# Patient Record
Sex: MALE | Race: WHITE | NOT HISPANIC OR LATINO | Employment: FULL TIME | URBAN - METROPOLITAN AREA
[De-identification: names, ages, dates, MRNs, and addresses within clinical notes are randomized per-mention and may not be internally consistent; named-entity substitution may affect disease eponyms.]

---

## 2021-04-08 ENCOUNTER — OFFICE VISIT (OUTPATIENT)
Dept: URGENT CARE | Facility: CLINIC | Age: 46
End: 2021-04-08
Payer: COMMERCIAL

## 2021-04-08 VITALS
HEIGHT: 72 IN | TEMPERATURE: 98 F | RESPIRATION RATE: 16 BRPM | OXYGEN SATURATION: 99 % | BODY MASS INDEX: 31.69 KG/M2 | HEART RATE: 74 BPM | DIASTOLIC BLOOD PRESSURE: 100 MMHG | WEIGHT: 234 LBS | SYSTOLIC BLOOD PRESSURE: 152 MMHG

## 2021-04-08 DIAGNOSIS — H60.391 OTHER INFECTIVE OTITIS EXTERNA OF RIGHT EAR, UNSPECIFIED CHRONICITY: ICD-10-CM

## 2021-04-08 DIAGNOSIS — H61.21 HEARING LOSS DUE TO CERUMEN IMPACTION, RIGHT: Primary | ICD-10-CM

## 2021-04-08 DIAGNOSIS — R03.0 ELEVATED BLOOD PRESSURE READING: ICD-10-CM

## 2021-04-08 PROCEDURE — 69209 REMOVE IMPACTED EAR WAX UNI: CPT | Performed by: PHYSICIAN ASSISTANT

## 2021-04-08 PROCEDURE — 99203 OFFICE O/P NEW LOW 30 MIN: CPT | Performed by: PHYSICIAN ASSISTANT

## 2021-04-08 RX ORDER — OFLOXACIN 3 MG/ML
10 SOLUTION AURICULAR (OTIC) DAILY
Qty: 5 ML | Refills: 0 | Status: SHIPPED | OUTPATIENT
Start: 2021-04-08 | End: 2021-04-15

## 2021-04-08 NOTE — PATIENT INSTRUCTIONS
Cerumen Impaction   WHAT YOU NEED TO KNOW:   Cerumen impaction is the blockage of the outer ear canal by tightly packed cerumen (earwax)  It is generally treated with procedures such as flushing or suctioning the ear canal or the use of instruments to remove the impaction  DISCHARGE INSTRUCTIONS:   Medicines:  · Ear drops: These are used to soften the wax in your ear  Wax softening ear drops may be bought without a prescription  Ask your healthcare provider how often you should use this medicine  Read the instructions carefully before you use the ear drops  Do the following when you put in ear drops:     ? Warm the drops by holding the bottle in your hands for a few minutes  Cold ear drops may make you dizzy  ? Lie down with the affected ear toward the ceiling  You may also stand with your head tilted to one side  ? Pull your ear lobe up and back, and place the correct number of drops into the ear  ? Keep your ear facing up for 5 to 10 minutes so the drops coat the outer ear canal      ? Gently clean the outer part of the ear with a cotton swab  Do not  place the cotton swab or anything inside your ear canal  This increases the risk of damaging your eardrum  · Take your medicine as directed  Contact your healthcare provider if you think your medicine is not helping or if you have side effects  Tell him of her if you are allergic to any medicine  Keep a list of the medicines, vitamins, and herbs you take  Include the amounts, and when and why you take them  Bring the list or the pill bottles to follow-up visits  Carry your medicine list with you in case of an emergency  Follow up with your healthcare provider as directed:  Write down your questions so you remember to ask them during your visits  Contact your healthcare provider if:   · You have a fever  · You have trouble hearing or ringing in your ear  · You have questions about your condition or care      Return to the emergency department if:   · You feel dizzy  · You have discharge or blood coming out of your ear  · Your ear pain does not go away or gets worse  © Copyright [employer] 2018 Information is for End User's use only and may not be sold, redistributed or otherwise used for commercial purposes  All illustrations and images included in CareNotes® are the copyrighted property of [employer]  or [facility] [employer]   The above information is an  only  It is not intended as medical advice for individual conditions or treatments  Talk to your doctor, nurse or pharmacist before following any medical regimen to see if it is safe and effective for you

## 2021-04-08 NOTE — PROGRESS NOTES
[facility]        NAME: Sylvester Asencio is a 48 y o  male  : 1975    MRN: [id number]  DATE: 2021  TIME: 5:54 PM    Assessment and Plan   Hearing loss due to cerumen impaction, right [H61 21]  1  Hearing loss due to cerumen impaction, right  Ear cerumen removal   2  Other infective otitis externa of right ear, unspecified chronicity  ofloxacin (FLOXIN) 0 3 % otic solution   3  Elevated blood pressure reading       Of note, elevated BP during triage  States his PCP and ciropractor have noted elevated BP w/o official dx of HTN  Asymptomatic  Advised f/u with PCP for possible HTN meds  Patient Instructions   Medications as prescribed  Do not use Q-tips  Follow up with PCP in 3-5 days  Proceed to  ER if symptoms worsen  Chief Complaint     Chief Complaint   Patient presents with    blocked right ear     was cleaning right ear with a q-tip last night and since that time cannot hear out of ear         History of Present Illness       Patient is a 48year old male with no sig PMHX presents to the office c/o blocked hearing in right ear since last night  States he was cleaning his ear with a q-tip and has not been able to hear since  No pain  Tried OTC ear drops w/o relief  Review of Systems   Review of Systems   HENT: Positive for hearing loss  Negative for ear pain            Current Medications       Current Outpatient Medications:     ofloxacin (FLOXIN) 0 3 % otic solution, Administer 10 drops to the right ear daily for 7 days, Disp: 5 mL, Rfl: 0    Current Allergies     Allergies as of 2021    (No Known Allergies)            The following portions of the patient's history were reviewed and updated as appropriate: allergies, current medications, past family history, past medical history, past social history, past surgical history and problem list      Past Medical History:   Diagnosis Date    Known health problems: none        Past Surgical History:   Procedure Laterality Date    NO PAST SURGERIES         Family History   Problem Relation Age of Onset    Arthritis Mother     Cancer Father          Medications have been verified  Objective   /100   Pulse 74   Temp 98 °F (36 7 °C)   Resp 16   Ht 6' (1 829 m)   Wt 106 kg (234 lb)   SpO2 99%   BMI 31 74 kg/m²   No LMP for male patient  Physical Exam     Physical Exam  Vitals signs and nursing note reviewed  Constitutional:       Appearance: Normal appearance  He is well-developed  HENT:      Head: Normocephalic and atraumatic  Right Ear: Ear canal and external ear normal  Swelling (mild extenral canal) present  No drainage or tenderness  A middle ear effusion (serous nonpurulent) is present  There is impacted cerumen  Tympanic membrane is not perforated  Left Ear: Tympanic membrane, ear canal and external ear normal       Nose: Nose normal       Mouth/Throat:      Pharynx: Uvula midline  Eyes:      General: Lids are normal       Conjunctiva/sclera: Conjunctivae normal       Pupils: Pupils are equal, round, and reactive to light  Neck:      Musculoskeletal: Neck supple  Cardiovascular:      Rate and Rhythm: Normal rate and regular rhythm  Heart sounds: Normal heart sounds  No murmur  No friction rub  No gallop  Pulmonary:      Effort: Pulmonary effort is normal       Breath sounds: Normal breath sounds  No stridor  No wheezing or rales  Musculoskeletal: Normal range of motion  Skin:     General: Skin is warm and dry  Capillary Refill: Capillary refill takes less than 2 seconds  Neurological:      Mental Status: He is alert  Ear cerumen removal    Date/Time: 4/8/2021 5:38 PM  Performed by: Chika Wellington PA-C  Authorized by: Chika Wellington PA-C   Universal Protocol:  Consent: Verbal consent obtained    Risks and benefits: risks, benefits and alternatives were discussed  Consent given by: patient and parent  Time out: Immediately prior to procedure a "time out" was called to verify the correct patient, procedure, equipment, support staff and site/side marked as required  Timeout called at: 4/8/2021 4:45 PM   Patient understanding: patient states understanding of the procedure being performed  Required items: required blood products, implants, devices, and special equipment available  Patient identity confirmed: verbally with patient      Patient location:  Bedside  Procedure details:     Location:  R ear    Procedure type: irrigation only      Approach:  External  Post-procedure details:     Complication:  None    Hearing quality:  Improved    Patient tolerance of procedure:   Tolerated well, no immediate complications

## 2021-08-21 ENCOUNTER — APPOINTMENT (EMERGENCY)
Dept: CT IMAGING | Facility: HOSPITAL | Age: 46
End: 2021-08-21
Payer: COMMERCIAL

## 2021-08-21 ENCOUNTER — APPOINTMENT (EMERGENCY)
Dept: RADIOLOGY | Facility: HOSPITAL | Age: 46
End: 2021-08-21
Payer: COMMERCIAL

## 2021-08-21 ENCOUNTER — APPOINTMENT (EMERGENCY)
Dept: MRI IMAGING | Facility: HOSPITAL | Age: 46
End: 2021-08-21
Payer: COMMERCIAL

## 2021-08-21 ENCOUNTER — HOSPITAL ENCOUNTER (EMERGENCY)
Facility: HOSPITAL | Age: 46
Discharge: HOME | End: 2021-08-21
Attending: EMERGENCY MEDICINE
Payer: COMMERCIAL

## 2021-08-21 VITALS
TEMPERATURE: 97.8 F | OXYGEN SATURATION: 97 % | RESPIRATION RATE: 18 BRPM | SYSTOLIC BLOOD PRESSURE: 144 MMHG | WEIGHT: 236.11 LBS | BODY MASS INDEX: 32.02 KG/M2 | DIASTOLIC BLOOD PRESSURE: 99 MMHG | HEART RATE: 93 BPM

## 2021-08-21 DIAGNOSIS — R20.0 NUMBNESS: ICD-10-CM

## 2021-08-21 DIAGNOSIS — I63.9 STROKE (CEREBRUM) (HCC): Primary | ICD-10-CM

## 2021-08-21 DIAGNOSIS — I10 ESSENTIAL HYPERTENSION: ICD-10-CM

## 2021-08-21 DIAGNOSIS — M54.9 BACK PAIN: ICD-10-CM

## 2021-08-21 LAB
ANION GAP SERPL CALCULATED.3IONS-SCNC: 7 MMOL/L (ref 4–13)
APTT PPP: 26 SECONDS (ref 23–37)
BILIRUB UR QL STRIP: NEGATIVE
BUN SERPL-MCNC: 17 MG/DL (ref 5–25)
CALCIUM SERPL-MCNC: 9.2 MG/DL (ref 8.3–10.1)
CHLORIDE SERPL-SCNC: 102 MMOL/L (ref 100–108)
CLARITY UR: CLEAR
CO2 SERPL-SCNC: 31 MMOL/L (ref 21–32)
COLOR UR: YELLOW
CREAT SERPL-MCNC: 1.06 MG/DL (ref 0.6–1.3)
ERYTHROCYTE [DISTWIDTH] IN BLOOD BY AUTOMATED COUNT: 12.5 % (ref 11.6–15.1)
GFR SERPL CREATININE-BSD FRML MDRD: 83 ML/MIN/1.73SQ M
GLUCOSE SERPL-MCNC: 108 MG/DL (ref 65–140)
GLUCOSE SERPL-MCNC: 111 MG/DL (ref 65–140)
GLUCOSE UR STRIP-MCNC: NEGATIVE MG/DL
HCT VFR BLD AUTO: 47.6 % (ref 36.5–49.3)
HGB BLD-MCNC: 16.3 G/DL (ref 12–17)
HGB UR QL STRIP.AUTO: NEGATIVE
INR PPP: 0.97 (ref 0.84–1.19)
KETONES UR STRIP-MCNC: NEGATIVE MG/DL
LEUKOCYTE ESTERASE UR QL STRIP: NEGATIVE
MCH RBC QN AUTO: 31.5 PG (ref 26.8–34.3)
MCHC RBC AUTO-ENTMCNC: 34.2 G/DL (ref 31.4–37.4)
MCV RBC AUTO: 92 FL (ref 82–98)
NITRITE UR QL STRIP: NEGATIVE
PH UR STRIP.AUTO: 7 [PH]
PLATELET # BLD AUTO: 256 THOUSANDS/UL (ref 149–390)
PMV BLD AUTO: 10.3 FL (ref 8.9–12.7)
POTASSIUM SERPL-SCNC: 4.2 MMOL/L (ref 3.5–5.3)
PROT UR STRIP-MCNC: NEGATIVE MG/DL
PROTHROMBIN TIME: 12.7 SECONDS (ref 11.6–14.5)
RBC # BLD AUTO: 5.18 MILLION/UL (ref 3.88–5.62)
SARS-COV-2 RNA RESP QL NAA+PROBE: NEGATIVE
SODIUM SERPL-SCNC: 140 MMOL/L (ref 136–145)
SP GR UR STRIP.AUTO: 1.01 (ref 1–1.03)
TROPONIN I SERPL-MCNC: <0.02 NG/ML
UROBILINOGEN UR QL STRIP.AUTO: 0.2 E.U./DL
WBC # BLD AUTO: 6.43 THOUSAND/UL (ref 4.31–10.16)

## 2021-08-21 PROCEDURE — 84484 ASSAY OF TROPONIN QUANT: CPT | Performed by: EMERGENCY MEDICINE

## 2021-08-21 PROCEDURE — U0005 INFEC AGEN DETEC AMPLI PROBE: HCPCS | Performed by: EMERGENCY MEDICINE

## 2021-08-21 PROCEDURE — 80048 BASIC METABOLIC PNL TOTAL CA: CPT | Performed by: EMERGENCY MEDICINE

## 2021-08-21 PROCEDURE — 82948 REAGENT STRIP/BLOOD GLUCOSE: CPT

## 2021-08-21 PROCEDURE — 93005 ELECTROCARDIOGRAM TRACING: CPT

## 2021-08-21 PROCEDURE — 70551 MRI BRAIN STEM W/O DYE: CPT

## 2021-08-21 PROCEDURE — 71045 X-RAY EXAM CHEST 1 VIEW: CPT

## 2021-08-21 PROCEDURE — 99285 EMERGENCY DEPT VISIT HI MDM: CPT | Performed by: EMERGENCY MEDICINE

## 2021-08-21 PROCEDURE — 99284 EMERGENCY DEPT VISIT MOD MDM: CPT

## 2021-08-21 PROCEDURE — G1004 CDSM NDSC: HCPCS

## 2021-08-21 PROCEDURE — 36415 COLL VENOUS BLD VENIPUNCTURE: CPT | Performed by: EMERGENCY MEDICINE

## 2021-08-21 PROCEDURE — U0003 INFECTIOUS AGENT DETECTION BY NUCLEIC ACID (DNA OR RNA); SEVERE ACUTE RESPIRATORY SYNDROME CORONAVIRUS 2 (SARS-COV-2) (CORONAVIRUS DISEASE [COVID-19]), AMPLIFIED PROBE TECHNIQUE, MAKING USE OF HIGH THROUGHPUT TECHNOLOGIES AS DESCRIBED BY CMS-2020-01-R: HCPCS | Performed by: EMERGENCY MEDICINE

## 2021-08-21 PROCEDURE — 85027 COMPLETE CBC AUTOMATED: CPT | Performed by: EMERGENCY MEDICINE

## 2021-08-21 PROCEDURE — 70498 CT ANGIOGRAPHY NECK: CPT

## 2021-08-21 PROCEDURE — 85610 PROTHROMBIN TIME: CPT | Performed by: EMERGENCY MEDICINE

## 2021-08-21 PROCEDURE — 85730 THROMBOPLASTIN TIME PARTIAL: CPT | Performed by: EMERGENCY MEDICINE

## 2021-08-21 PROCEDURE — 70496 CT ANGIOGRAPHY HEAD: CPT

## 2021-08-21 PROCEDURE — 81003 URINALYSIS AUTO W/O SCOPE: CPT | Performed by: EMERGENCY MEDICINE

## 2021-08-21 RX ORDER — NAPROXEN 375 MG/1
375 TABLET ORAL 2 TIMES DAILY WITH MEALS
Qty: 20 TABLET | Refills: 0 | Status: SHIPPED | OUTPATIENT
Start: 2021-08-21

## 2021-08-21 RX ORDER — ASPIRIN 81 MG/1
324 TABLET, CHEWABLE ORAL ONCE
Status: COMPLETED | OUTPATIENT
Start: 2021-08-21 | End: 2021-08-21

## 2021-08-21 RX ADMIN — IOHEXOL 85 ML: 350 INJECTION, SOLUTION INTRAVENOUS at 10:02

## 2021-08-21 RX ADMIN — ASPIRIN 324 MG: 81 TABLET, CHEWABLE ORAL at 11:13

## 2021-08-21 NOTE — ED NOTES
Patient reports symptoms have been on going for about 2 months prior to coming in to the ED today       Anusha Rosado RN  08/21/21 1002

## 2021-08-21 NOTE — Clinical Note
Pamela Andrade was seen and treated in our emergency department on 8/21/2021  Diagnosis:     Taj Sierra  may return to work on return date  He may return on this date: 08/23/2021         If you have any questions or concerns, please don't hesitate to call        Raimundo Garcia MD    ______________________________           _______________          _______________  Hospital Representative                              Date                                Time

## 2021-08-21 NOTE — ED PROVIDER NOTES
History  Chief Complaint   Patient presents with    Back Pain     pt states R sided back pain and increase in urination for the past few weeks  states lower abdominal pain with urination as well  this morning woke up with R side arm and leg numbness  denies numbness in face, equal strength  neuro otherwise intact     47-year-old male with unremarkable past medical history presents with 2-3 weeks of urinary frequency and intermittent right low back and right lower abdominal pain  Patient denies these complaints at this time, however  Patient comes to the emergency department as he presents with right arm and right leg numbness and tingling that started upon awakening this morning at 8:00 a m     Last normal was at 10:00 p m  Last night when patient went to bed  No previous history of transient ischemic attack or stroke  Patient is not on blood thinners  Sisters at bedside states that patient has not had slurred speech or facial droop  Patient has no complaints at this time other than stating his right arm and leg feel numb  On exam, patient has normal sensation  Denies history of neck or muscle spasms, cervical radiculopathy, low back injury, surgery or history of sciatica  No history of nephrolithiasis, abdominal aortic aneurysm, hernia or diverticulitis  Patient denies fever, chills, urinary symptoms, constipation, bloody stool, abdominal pain, chest pain, shortness of breath, nausea, vomiting or diarrhea  Review of medical chart shows no recent hospitalizations        History provided by:  Patient, medical records and relative   used: No    CVA/TIA-like Symptoms  Presenting symptoms: sensory loss    Presenting symptoms: no change in level of consciousness, no headaches, no disturbances in coordination, no language symptoms, no loss of balance, no visual change and no weakness    Date/time of last known well:  8/20/2021 10:00 PM  Onset quality:  Sudden  Duration:  90 minutes  Timing: Constant  Progression:  Unchanged  Similar to previous episodes: no    Associated symptoms: paresthesias    Associated symptoms: no chest pain, no dizziness, no facial pain, no fall, no fever, no bladder incontinence, no nausea, no neck pain, no seizures, no vertigo and no vomiting        None       Past Medical History:   Diagnosis Date    Known health problems: none        Past Surgical History:   Procedure Laterality Date    NO PAST SURGERIES         Family History   Problem Relation Age of Onset    Arthritis Mother     Cancer Father      I have reviewed and agree with the history as documented  E-Cigarette/Vaping    E-Cigarette Use Never User      E-Cigarette/Vaping Substances     Social History     Tobacco Use    Smoking status: Never Smoker    Smokeless tobacco: Never Used   Vaping Use    Vaping Use: Never used   Substance Use Topics    Alcohol use: Yes    Drug use: Never       Review of Systems   Constitutional: Negative  Negative for chills and fever  HENT: Negative  Eyes: Negative  Respiratory: Negative  Negative for cough and shortness of breath  Cardiovascular: Negative  Negative for chest pain  Gastrointestinal: Positive for abdominal pain ( right lower quadrant, resolved)  Negative for blood in stool, constipation, diarrhea, nausea, rectal pain and vomiting  Endocrine: Negative  Genitourinary: Positive for frequency ( resolved)  Negative for bladder incontinence, decreased urine volume, difficulty urinating, discharge, dysuria, flank pain, hematuria, penile pain, penile swelling, scrotal swelling, testicular pain ( resolved) and urgency  Musculoskeletal: Positive for back pain ( resolved)  Negative for arthralgias, gait problem, joint swelling, myalgias, neck pain and neck stiffness  Skin: Negative  Negative for rash  Allergic/Immunologic: Negative  Neurological: Positive for numbness and paresthesias   Negative for dizziness, vertigo, tremors, seizures, syncope, facial asymmetry, speech difficulty, weakness, light-headedness, headaches, disturbances in coordination and loss of balance  Hematological: Negative  Psychiatric/Behavioral: Negative  Negative for hallucinations  Physical Exam  Physical Exam  Vitals and nursing note reviewed  Exam conducted with a chaperone present  Constitutional:       General: He is not in acute distress  Appearance: Normal appearance  He is well-developed  He is obese  He is not ill-appearing, toxic-appearing or diaphoretic  Comments: Well appearing, in no acute distress   HENT:      Head: Normocephalic and atraumatic  Jaw: There is normal jaw occlusion  Right Ear: Hearing, tympanic membrane, ear canal and external ear normal  There is no impacted cerumen  No mastoid tenderness  No hemotympanum  Left Ear: Hearing, tympanic membrane, ear canal and external ear normal  There is no impacted cerumen  No mastoid tenderness  No hemotympanum  Nose: Nose normal       Right Nostril: No epistaxis  Left Nostril: No epistaxis  Right Sinus: No maxillary sinus tenderness or frontal sinus tenderness  Left Sinus: No maxillary sinus tenderness or frontal sinus tenderness  Mouth/Throat:      Lips: Pink  No lesions  Mouth: Mucous membranes are moist  No lacerations or angioedema  Tongue: No lesions  Tongue does not deviate from midline  Palate: No mass and lesions  Pharynx: Oropharynx is clear  Uvula midline  No pharyngeal swelling, oropharyngeal exudate, posterior oropharyngeal erythema or uvula swelling  Tonsils: No tonsillar exudate or tonsillar abscesses  Eyes:      General: Lids are normal  Vision grossly intact  Gaze aligned appropriately  No visual field deficit or scleral icterus  Right eye: No discharge  Left eye: No discharge  Extraocular Movements: Extraocular movements intact  Right eye: No nystagmus  Left eye: No nystagmus  Conjunctiva/sclera: Conjunctivae normal       Right eye: Right conjunctiva is not injected  Left eye: Left conjunctiva is not injected  Pupils: Pupils are equal, round, and reactive to light  Neck:      Thyroid: No thyroid mass or thyromegaly  Trachea: Trachea and phonation normal    Cardiovascular:      Rate and Rhythm: Normal rate and regular rhythm  Pulses: Normal pulses  Radial pulses are 2+ on the right side and 2+ on the left side  Dorsalis pedis pulses are 2+ on the right side and 2+ on the left side  Heart sounds: Normal heart sounds, S1 normal and S2 normal    Pulmonary:      Effort: Pulmonary effort is normal  No tachypnea, accessory muscle usage, respiratory distress or retractions  Breath sounds: Normal breath sounds and air entry  No stridor or decreased air movement  No decreased breath sounds, wheezing, rhonchi or rales  Chest:      Chest wall: No tenderness  Abdominal:      General: Abdomen is flat  Bowel sounds are normal  There is no distension  Palpations: Abdomen is soft  Abdomen is not rigid  There is no mass  Tenderness: There is no abdominal tenderness  There is no right CVA tenderness, left CVA tenderness, guarding or rebound  Negative signs include Farrell's sign and McBurney's sign  Hernia: No hernia is present  Genitourinary:     Penis: Normal        Testes: Normal    Musculoskeletal:         General: No swelling, tenderness, deformity or signs of injury  Normal range of motion  Cervical back: Full passive range of motion without pain, normal range of motion and neck supple  No rigidity  No spinous process tenderness or muscular tenderness  Right lower leg: No edema  Left lower leg: No edema  Lymphadenopathy:      Cervical: No cervical adenopathy  Skin:     General: Skin is warm and dry  Capillary Refill: Capillary refill takes less than 2 seconds        Coloration: Skin is not ashen, cyanotic, jaundiced, mottled, pale or sallow  Findings: No abrasion, abscess, acne, bruising, burn, ecchymosis, erythema, signs of injury, laceration, lesion, petechiae, rash or wound  Rash is not macular or papular  Neurological:      General: No focal deficit present  Mental Status: He is alert and oriented to person, place, and time  Mental status is at baseline  He is not disoriented  GCS: GCS eye subscore is 4  GCS verbal subscore is 5  GCS motor subscore is 6  Cranial Nerves: Cranial nerves are intact  No cranial nerve deficit, dysarthria or facial asymmetry  Sensory: Sensation is intact  No sensory deficit  Motor: Motor function is intact  No weakness, tremor, atrophy, abnormal muscle tone or seizure activity  Coordination: Coordination is intact  Coordination normal  Finger-Nose-Finger Test normal       Gait: Gait is intact  Gait normal       Comments: Patient is AAOx4, GCS 15; speaking clearly and appropriately; motor and sensation intact; visual fields intact; cranial nerves II-XII grossly intact; no facial droop, slurred speech or arm drift  Patient states he has numbness to right arm and leg, however, upon physical exam he has full sensation to bilateral arms and legs   Psychiatric:         Attention and Perception: Attention and perception normal  He is attentive  Mood and Affect: Mood and affect normal          Speech: Speech normal          Behavior: Behavior normal  Behavior is cooperative  Thought Content:  Thought content normal          Cognition and Memory: Cognition and memory normal          Judgment: Judgment normal          Vital Signs  ED Triage Vitals [08/21/21 0925]   Temperature Pulse Respirations Blood Pressure SpO2   97 5 °F (36 4 °C) 74 18 (!) 223/132 100 %      Temp Source Heart Rate Source Patient Position - Orthostatic VS BP Location FiO2 (%)   Temporal Monitor Lying Left arm --      Pain Score       5           Vitals:    08/21/21 1343 08/21/21 1345 08/21/21 1350 08/21/21 1355   BP: 145/100 144/99     Pulse: 81 81 85 93   Patient Position - Orthostatic VS: Sitting            Visual Acuity  Visual Acuity      Most Recent Value   L Pupil Size (mm)  3   R Pupil Size (mm)  3          ED Medications  Medications   iohexol (OMNIPAQUE) 350 MG/ML injection (SINGLE-DOSE) 100 mL (85 mL Intravenous Given 8/21/21 1002)   aspirin chewable tablet 324 mg (324 mg Oral Given 8/21/21 1113)       Diagnostic Studies  Results Reviewed     Procedure Component Value Units Date/Time    Novel Coronavirus (Covid-19),PCR [facility] - 2 hour stat [[id number]]  (Normal) Collected: 08/21/21 0944    Lab Status: Final result Specimen: Nares from Nasopharyngeal Swab Updated: 08/21/21 1041     SARS-CoV-2 Negative    Narrative: The specimen collection materials, transport medium, and/or testing methodology utilized in the production of these test results have been proven to be reliable in a limited validation with an abbreviated program under the Emergency Utilization Authorization provided by the FDA  Testing reported as "Presumptive positive" will be confirmed with secondary testing to ensure result accuracy  Clinical caution and judgement should be used with the interpretation of these results with consideration of the clinical impression and other laboratory testing  Testing reported as "Positive" or "Negative" has been proven to be accurate according to standard laboratory validation requirements  All testing is performed with control materials showing appropriate reactivity at standard intervals        Troponin I [[id number]]  (Normal) Collected: 08/21/21 0944    Lab Status: Final result Specimen: Blood from Arm, Left Updated: 08/21/21 1010     Troponin I <0 02 ng/mL     Basic metabolic panel [[id number]] Collected: 08/21/21 0944    Lab Status: Final result Specimen: Blood from Arm, Left Updated: 08/21/21 1003     Sodium 140 mmol/L      Potassium 4 2 mmol/L      Chloride 102 mmol/L      CO2 31 mmol/L      ANION GAP 7 mmol/L      BUN 17 mg/dL      Creatinine 1 06 mg/dL      Glucose 111 mg/dL      Calcium 9 2 mg/dL      eGFR 83 ml/min/1 73sq m     Narrative:      [place] guidelines for Chronic Kidney Disease (CKD):     Stage 1 with normal or high GFR (GFR > 90 mL/min/1 73 square meters)    Stage 2 Mild CKD (GFR = 60-89 mL/min/1 73 square meters)    Stage 3A Moderate CKD (GFR = 45-59 mL/min/1 73 square meters)    Stage 3B Moderate CKD (GFR = 30-44 mL/min/1 73 square meters)    Stage 4 Severe CKD (GFR = 15-29 mL/min/1 73 square meters)    Stage 5 End Stage CKD (GFR <15 mL/min/1 73 square meters)  Note: GFR calculation is accurate only with a steady state creatinine    Protime-INR [[id number]]  (Normal) Collected: 08/21/21 0944    Lab Status: Final result Specimen: Blood from Arm, Left Updated: 08/21/21 1002     Protime 12 7 seconds      INR 0 97    APTT [[id number]]  (Normal) Collected: 08/21/21 0944    Lab Status: Final result Specimen: Blood from Arm, Left Updated: 08/21/21 1002     PTT 26 seconds     UA w Reflex to Microscopic w Reflex to Culture [[id number]] Collected: 08/21/21 0948    Lab Status: Final result Specimen: Urine, Clean Catch Updated: 08/21/21 0954     Color, UA Yellow     Clarity, UA Clear     Specific Gravity, UA 1 015     pH, UA 7 0     Leukocytes, UA Negative     Nitrite, UA Negative     Protein, UA Negative mg/dl      Glucose, UA Negative mg/dl      Ketones, UA Negative mg/dl      Urobilinogen, UA 0 2 E U /dl      Bilirubin, UA Negative     Blood, UA Negative    CBC and Platelet [[id number]]  (Normal) Collected: 08/21/21 0944    Lab Status: Final result Specimen: Blood from Arm, Left Updated: 08/21/21 0950     WBC 6 43 Thousand/uL      RBC 5 18 Million/uL      Hemoglobin 16 3 g/dL      Hematocrit 47 6 %      MCV 92 fL      MCH 31 5 pg      MCHC 34 2 g/dL      RDW 12 5 %      Platelets 810 Thousands/uL      MPV 10 3 fL     Fingerstick Glucose (POCT) [[id number]]  (Normal) Collected: 08/21/21 0942    Lab Status: Final result Updated: 08/21/21 0944     POC Glucose 108 mg/dl                  MRI brain wo contrast   Final Result by Waylon Ramos DO (08/21 1337)      Normal       Workstation performed: [id number]         X-ray chest 1 view portable   ED Interpretation by Prateek Thao MD (08/21 1038)   Chest x-ray read and interpreted by me  Negative for pneumothorax, mediastinal widening, rib fracture, focal consolidation or pleural effusion  CTA stroke alert (head/neck)   Final Result by Waylon Ramos DO (08/21 1009)      No focal intracranial stenosis or aneurysm  Mild atheromatous plaque along the right internal carotid artery without significant stenosis  Findings were directly discussed with Dr Lizzie Ferrari at 10:03 AM                         Workstation performed: [id number]         CT stroke alert brain   Final Result by Waylon Ramos DO (08/21 1005)      No acute intracranial abnormality  Findings were directly discussed with Dr Lizzie Ferrari at 10:03 AM       Workstation performed: [id number]                    Procedures  ECG 12 Lead Documentation Only    Date/Time: 8/21/2021 9:27 AM  Performed by: Prateek Thao MD  Authorized by: Prateek Thao MD     Indications / Diagnosis:  Stroke  ECG reviewed by me, the ED Provider: yes    Patient location:  ED  Previous ECG:     Comparison to cardiac monitor: Yes    Interpretation:     Interpretation: normal    Rate:     ECG rate:  83bpm    ECG rate assessment: normal    Rhythm:     Rhythm: sinus rhythm    Ectopy:     Ectopy: none    QRS:     QRS axis:  Left  Conduction:     Conduction: normal    ST segments:     ST segments:  Normal  T waves:     T waves: flattening and inverted      Inverted:  III, aVR and V1  Comments:      No STEMI  IL 150ms  QRS 90ms  QT 415ms    Cardiac monitoring ordered  Heart rate and rhythm as above   I have personally read and interpreted the EKG as above  ED Course  ED Course as of Aug 21 1739   Sat Aug 21, 2021   9984 Stroke Alert called      1281 Spoke with Dr Robert Macias, Neurology who states that stroke is unlikely with NIH 0 and no right facial numbness but recommends rule out with CTH/CTA and observation status for MRI brain  If negative, symptoms could be due to hypertensive urgency  NIH 0  Patient has no back or abdominal pain on exam  He states his right arm and leg are numb but has no decreased sensation on clinical examination  80 CTA:IMPRESSION:     No focal intracranial stenosis or aneurysm      Mild atheromatous plaque along the right internal carotid artery without significant stenosis            1031 CTH:IMPRESSION:     No acute intracranial abnormality      Findings were directly discussed with Dr Robert Macias at 10:03 AM       80 Texted Dr Robert Macias, Neurology regarding negative imaging  Texted Dr Nishant Mckeon, Hospitalist for [place] observation status for stroke pathway  Updated patient on labs and imaging  Has normal neuro exam  No back or abdominal tenderness on exam       1052 Spoke with Dr Hortense Denver, Radiologist who approves MRI to be completed in the emergency department  If negative, patient will be discharged home  Informed hospitalist       5217 COVID-19 negative      1104 Reassessed patient  He states he has minimal numbness to right hand and foot, improved from earlier  Neuro exam is normal however  Updated patient on labs, imaging plan for MRI brain while in the emergency department  He is agreeable to plan  No difficulty urinating while in the emergency department patient has no back or abdominal tenderness  1104 COVID-19 negative       with MRI tech  He will be done after 1300 due to outpatients getting imaging this morning  [address] MRI brain:IMPRESSION:     Normal       1339 Texted Dr Robert Macias with MRI results  [address] patient  Reviewed labs and imaging    Plan for discharge with primary care provider follow-up for his hypertension  Work note provided  Script for naproxen provided  Strict return to ER precautions discussed with and acknowledged by patient  1347 /99                    Stroke Assessment     Row Name 08/21/21 4293             NIH Stroke Scale    Interval  2 hours post-treatment      Level of Consciousness (1a )  0      LOC Questions (1b )  0      LOC Commands (1c )  0      Best Gaze (2 )  0      Visual (3 )  0      Facial Palsy (4 )  0      Motor Arm, Left (5a )  0      Motor Arm, Right (5b )  0      Motor Leg, Left (6a )  0      Motor Leg, Right (6b )  0      Limb Ataxia (7 )  0      Sensory (8 )  0      Best Language (9 )  0      Dysarthria (10 )  0      Extinction and Inattention (11 ) (Formerly Neglect)  0      Total  0          First Filed Value   TPA Decision  Patient not a TPA candidate  Patient is not a candidate options  Symptoms resolved/clearly non disabling                                  MDM  Number of Diagnoses or Management Options     Amount and/or Complexity of Data Reviewed  Clinical lab tests: reviewed and ordered  Tests in the radiology section of CPT®: reviewed and ordered  Tests in the medicine section of CPT®: ordered and reviewed  Obtain history from someone other than the patient: yes (Sister at bedside)  Review and summarize past medical records: yes  Discuss the patient with other providers: yes (Neurology, Dr Mikey Aldana, Dr Diallo  Radiology, Dr Elham Knight)  Independent visualization of images, tracings, or specimens: yes (CTH, CTA, MRI, EKG, CXR)    Risk of Complications, Morbidity, and/or Mortality  Presenting problems: moderate  Diagnostic procedures: moderate  Management options: moderate    Patient Progress  Patient progress: stable      Disposition  Final diagnoses:   Stroke (cerebrum) ([facility] [address] )   Numbness   Essential hypertension   Back pain     Time reflects when diagnosis was documented in both MDM as applicable and the Disposition within this note     Time User Action Codes Description Comment    8/21/2021  9:42 AM Minus Ramp Add [I63 9] Stroke (cerebrum) ([address] )     8/21/2021  1:44 PM Minus Ramp Add [R20 0] Numbness     8/21/2021  1:45 PM Minus Ramp Add [I10] Essential hypertension     8/21/2021  1:45 PM Minus Ramp Add [M54 9] Back pain       ED Disposition     ED Disposition Condition Date/Time Comment    Discharge Stable Sat Aug 21, 2021  1:44 PM Discharged home         Follow-up Information     Follow up With Specialties Details Why Contact Info    Nhan Staton MD Family Medicine Call in 2 days As needed, If symptoms worsen [facility] [phone] [facility]  [phone]            Discharge Medication List as of 8/21/2021  1:47 PM      START taking these medications    Details   naproxen (NAPROSYN) 375 mg tablet Take 1 tablet (375 mg total) by mouth 2 (two) times a day with meals, Starting Sat 8/21/2021, Normal               PDMP Review     None          ED Provider  Electronically Signed by      MD Lucho Nair MD  08/21/21 9062

## 2021-08-21 NOTE — QUICK NOTE
Stroke alert note:  Pt is 48year old male who presents with right arm and leg tingling and numbness  Unclear symptom onset, but it has been possibly for up to 2 months as per a nurse note  However, he endorsed onset of less than 24 hour to ED MD   NIHSS 0  BP >153 systolic  CT head and CTA were nornal  tPA was not indicated for low NIHSS and being outside window  Impression is hypertensive urgency  MRI brain was later performed and was negative for stroke  Follow-up with PCP to see the need for outpatient neurological evaluation

## 2021-08-23 LAB
ATRIAL RATE: 83 BPM
P AXIS: 32 DEGREES
PR INTERVAL: 150 MS
QRS AXIS: 3 DEGREES
QRSD INTERVAL: 90 MS
QT INTERVAL: 354 MS
QTC INTERVAL: 415 MS
T WAVE AXIS: 16 DEGREES
VENTRICULAR RATE: 83 BPM

## 2022-12-09 ENCOUNTER — HOSPITAL ENCOUNTER (OUTPATIENT)
Dept: RADIOLOGY | Facility: CLINIC | Age: 47
End: 2022-12-09

## 2022-12-09 ENCOUNTER — OFFICE VISIT (OUTPATIENT)
Dept: OBGYN CLINIC | Facility: CLINIC | Age: 47
End: 2022-12-09

## 2022-12-09 VITALS
SYSTOLIC BLOOD PRESSURE: 138 MMHG | WEIGHT: 235 LBS | DIASTOLIC BLOOD PRESSURE: 90 MMHG | TEMPERATURE: 98.4 F | BODY MASS INDEX: 31.83 KG/M2 | HEART RATE: 83 BPM | HEIGHT: 72 IN

## 2022-12-09 DIAGNOSIS — M77.11 LATERAL EPICONDYLITIS OF RIGHT ELBOW: ICD-10-CM

## 2022-12-09 DIAGNOSIS — M25.521 RIGHT ELBOW PAIN: ICD-10-CM

## 2022-12-09 DIAGNOSIS — M77.11 LATERAL EPICONDYLITIS OF RIGHT ELBOW: Primary | ICD-10-CM

## 2022-12-09 RX ORDER — LISINOPRIL 20 MG/1
TABLET ORAL
COMMUNITY
Start: 2022-07-26

## 2022-12-09 RX ORDER — NAPROXEN 500 MG/1
500 TABLET ORAL 2 TIMES DAILY WITH MEALS
Qty: 60 TABLET | Refills: 0 | Status: SHIPPED | OUTPATIENT
Start: 2022-12-09

## 2023-01-17 ENCOUNTER — TELEPHONE (OUTPATIENT)
Dept: PAIN MEDICINE | Facility: MEDICAL CENTER | Age: 48
End: 2023-01-17

## 2023-01-17 NOTE — TELEPHONE ENCOUNTER
Caller: patient    Doctor/Office: ortho    Call regarding :  ortho     Call was transferred to: ortho

## 2023-01-20 ENCOUNTER — OFFICE VISIT (OUTPATIENT)
Dept: OBGYN CLINIC | Facility: CLINIC | Age: 48
End: 2023-01-20

## 2023-01-20 VITALS
WEIGHT: 234 LBS | HEIGHT: 73 IN | BODY MASS INDEX: 31.01 KG/M2 | HEART RATE: 68 BPM | TEMPERATURE: 98.4 F | DIASTOLIC BLOOD PRESSURE: 88 MMHG | SYSTOLIC BLOOD PRESSURE: 128 MMHG

## 2023-01-20 DIAGNOSIS — M77.11 LATERAL EPICONDYLITIS OF RIGHT ELBOW: Primary | ICD-10-CM

## 2023-01-20 DIAGNOSIS — M25.521 RIGHT ELBOW PAIN: ICD-10-CM

## 2023-01-20 NOTE — PROGRESS NOTES
1  Lateral epicondylitis of right elbow  Diclofenac Sodium (VOLTAREN) 1 %    Ambulatory Referral to Physical Therapy      2  Right elbow pain  Diclofenac Sodium (VOLTAREN) 1 %    Ambulatory Referral to Physical Therapy        Orders Placed This Encounter   Procedures   • Ambulatory Referral to Physical Therapy        Imaging Studies (I personally reviewed images in PACS and report):    • X-ray right elbow 12/13/2022: No acute osseous abnormalities  No significant degenerative changes  No joint effusion  IMPRESSION:  • Chronic atraumatic right elbow pain secondary to lateral epicondylitis  • Radiographic imaging unremarkable  • Improving since last visit with bracing, PT, nsaids    Other factors:  • Right-hand-dominant; reportedly works in an occupation that involves repetitive lifting with his upper extremities for several hours a day  • BMI 32    PLAN:    • Clinical exam and radiographic imaging reviewed with patient today, with impression as per above  I have discussed with the patient the pathophysiology of this diagnosis and reviewed how the examination correlates with this diagnosis  • Reassured patient that he is appropriately/progressively improving since last visit and at this time he can discuss with his physical therapist when to transition to a home exercise program   A new order for PT was provided today as recommended by his physical therapist   • In regards to pain control, I prescribed a topical Voltaren gel to help with transition off of oral NSAIDs and I counseled on the effects of chronic NSAID use  I also counseled alternating with acetaminophen to minimize use of NSAIDs  I still counseled  Use of heat/ice therapy 20 minutes on/off  • He can continue use of his elbow strap/brace as needed during work given his occupation involves repetitive use of his upper extremities daily  Return if symptoms worsen or fail to improve      Portions of the record may have been created with voice recognition software  Occasional wrong word or "sound a like" substitutions may have occurred due to the inherent limitations of voice recognition software  Read the chart carefully and recognize, using context, where substitutions have occurred  CHIEF COMPLAINT:  Chief Complaint   Patient presents with   • Right Elbow - Follow-up   • Follow-up         HPI:  Vianey Bowers is a 50 y o  male  who presents with his significant other in regards to    Visit 1/20/2023: Follow-up right elbow pain secondary to lateral epicondylitis:  Patient has been attending physical therapy with [facility] since her last visit and has paperwork confirming this  He states that his symptoms have been progressively improving since her last visit  He states improves include his significantly decreased intensity and frequency of pain  He states he continues to wear an elbow strap during work which does help facilitate performing his work duties  He states when he does have pain it can be over the lateral aspect of the elbow but it is of mild intensity and aching  He states resolution of numbness/tingling of his arm  Denies any elbow swelling, discoloration  Denies new injuries of his elbow since last visit  He states he does continue to take naproxen daily and is worried about discontinuing as he feels it would flareup his pain again          Medical, Surgical, Family, and Social History    Past Medical History:   Diagnosis Date   • Hypertension      Past Surgical History:   Procedure Laterality Date   • NO PAST SURGERIES       Social History   Social History     Substance and Sexual Activity   Alcohol Use Yes     Social History     Substance and Sexual Activity   Drug Use Never     Social History     Tobacco Use   Smoking Status Never   Smokeless Tobacco Never     Family History   Problem Relation Age of Onset   • Arthritis Mother    • Cancer Father      No Known Allergies       Physical Exam  /88 (BP Location: Left arm) Pulse 68   Temp 98 4 °F (36 9 °C) (Temporal)   Ht 6' 1" (1 854 m)   Wt 106 kg (234 lb)   BMI 30 87 kg/m²     Constitutional:  see vital signs  Gen:obese, normocephalic/atraumatic, well-groomed  Eyes: No inflammation or discharge of conjunctiva or lids; sclera clear s  Pulmonary/Chest: Effort normal  No respiratory distress  Right Elbow Exam     Tenderness   The patient is experiencing no tenderness  Range of Motion   Extension: 0   Flexion: 140     Tests   Varus: negative  Valgus: negative  Tinel's sign (cubital tunnel): negative    Other   Erythema: absent    Comments: Inspection there is a localized swelling over the lateral epicondylar aspect of his elbow      Cozen's tests: Denies pain (negative)  OK sign: intact  Froment Sign: intact  Thumb extension: 5/5: intact              Procedures

## 2023-03-20 ENCOUNTER — APPOINTMENT (EMERGENCY)
Dept: RADIOLOGY | Facility: HOSPITAL | Age: 48
End: 2023-03-20

## 2023-03-20 ENCOUNTER — HOSPITAL ENCOUNTER (EMERGENCY)
Facility: HOSPITAL | Age: 48
Discharge: HOME | End: 2023-03-20
Attending: EMERGENCY MEDICINE | Admitting: EMERGENCY MEDICINE

## 2023-03-20 VITALS
SYSTOLIC BLOOD PRESSURE: 176 MMHG | HEIGHT: 73 IN | RESPIRATION RATE: 18 BRPM | OXYGEN SATURATION: 100 % | HEART RATE: 86 BPM | WEIGHT: 233 LBS | BODY MASS INDEX: 30.88 KG/M2 | DIASTOLIC BLOOD PRESSURE: 110 MMHG | TEMPERATURE: 97.2 F

## 2023-03-20 DIAGNOSIS — M79.601 RIGHT ARM PAIN: Primary | ICD-10-CM

## 2023-03-20 RX ORDER — KETOROLAC TROMETHAMINE 30 MG/ML
15 INJECTION, SOLUTION INTRAMUSCULAR; INTRAVENOUS ONCE
Status: COMPLETED | OUTPATIENT
Start: 2023-03-20 | End: 2023-03-20

## 2023-03-20 RX ORDER — NAPROXEN 500 MG/1
500 TABLET ORAL 2 TIMES DAILY WITH MEALS
Qty: 30 TABLET | Refills: 0 | Status: SHIPPED | OUTPATIENT
Start: 2023-03-20

## 2023-03-20 RX ORDER — MORPHINE SULFATE 4 MG/ML
4 INJECTION, SOLUTION INTRAMUSCULAR; INTRAVENOUS ONCE
Status: COMPLETED | OUTPATIENT
Start: 2023-03-20 | End: 2023-03-20

## 2023-03-20 RX ORDER — ACETAMINOPHEN 325 MG/1
975 TABLET ORAL ONCE
Status: COMPLETED | OUTPATIENT
Start: 2023-03-20 | End: 2023-03-20

## 2023-03-20 RX ADMIN — KETOROLAC TROMETHAMINE 15 MG: 30 INJECTION, SOLUTION INTRAMUSCULAR; INTRAVENOUS at 13:58

## 2023-03-20 RX ADMIN — MORPHINE SULFATE 4 MG: 4 INJECTION INTRAVENOUS at 15:00

## 2023-03-20 RX ADMIN — ACETAMINOPHEN 325MG 975 MG: 325 TABLET ORAL at 13:58

## 2023-03-20 NOTE — ED PROVIDER NOTES
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  States previously seen by ortho due to arm pain over past month dx with "tennis elbow"  Pt taking advil w/o relief  HPI   48M right hand dominant presenting with right arm pain  For the past few months, patient has been dealing with right arm pain  He has seen sports medicine, and Xrays of right elbow and was diagnosed with lateral epicondylitis  He had physical therapy and reported symptoms improved  However at work, today the symptoms recurred  Today while patient was at work, he felt something pull in his right arm  He lifts lead plates at work  Has appt with sports medicine in 2 days  Prior to Admission Medications   Prescriptions Last Dose Informant Patient Reported? Taking? Diclofenac Sodium (VOLTAREN) 1 %   No No   Sig: Apply 2 g topically 4 (four) times a day   lisinopril (ZESTRIL) 20 mg tablet   Yes No   Si tab in am and pm   naproxen (NAPROSYN) 500 mg tablet   No No   Sig: Take 1 tablet (500 mg total) by mouth 2 (two) times a day with meals      Facility-Administered Medications: None       Past Medical History:   Diagnosis Date   • Hypertension        Past Surgical History:   Procedure Laterality Date   • NO PAST SURGERIES         Family History   Problem Relation Age of Onset   • Arthritis Mother    • Cancer Father      I have reviewed and agree with the history as documented  E-Cigarette/Vaping   • E-Cigarette Use Never User      E-Cigarette/Vaping Substances     Social History     Tobacco Use   • Smoking status: Never   • Smokeless tobacco: Never   Vaping Use   • Vaping Use: Never used   Substance Use Topics   • Alcohol use: Yes   • Drug use: Never       Review of Systems   Constitutional: Negative for chills and fever  HENT: Negative for ear pain and sore throat  Eyes: Negative for pain and visual disturbance     Respiratory: Negative for cough and shortness of breath  Cardiovascular: Negative for chest pain and palpitations  Gastrointestinal: Negative for abdominal pain and vomiting  Genitourinary: Negative for dysuria and hematuria  Musculoskeletal: Positive for arthralgias  Negative for back pain  Skin: Negative for color change and rash  Neurological: Negative for seizures and syncope  All other systems reviewed and are negative  Physical Exam  Physical Exam  Vitals and nursing note reviewed  Constitutional:       Appearance: He is well-developed  HENT:      Head: Normocephalic and atraumatic  Right Ear: External ear normal       Left Ear: External ear normal       Nose: Nose normal    Eyes:      Conjunctiva/sclera: Conjunctivae normal    Cardiovascular:      Rate and Rhythm: Normal rate and regular rhythm  Pulses:           Radial pulses are 2+ on the right side and 2+ on the left side  Pulmonary:      Effort: Pulmonary effort is normal  No respiratory distress  Breath sounds: Normal breath sounds  Abdominal:      Palpations: Abdomen is soft  Tenderness: There is no abdominal tenderness  Musculoskeletal:         General: No swelling  Cervical back: Normal range of motion and neck supple  Comments: RUE compartments are soft  Able to fully range right elbow w/o pain  +pain when pronating and supinating right forearm   Skin:     General: Skin is warm and dry  Findings: No erythema  Neurological:      General: No focal deficit present  Mental Status: He is alert  Mental status is at baseline  Sensory: Sensation is intact  Motor: Motor function is intact         Vital Signs  ED Triage Vitals [03/20/23 1317]   Temperature Pulse Respirations Blood Pressure SpO2   (!) 97 2 °F (36 2 °C) 86 18 (!) 176/110 100 %      Temp Source Heart Rate Source Patient Position - Orthostatic VS BP Location FiO2 (%)   Temporal Monitor Sitting Left arm --      Pain Score       7 Vitals:    03/20/23 1317   BP: (!) 176/110   Pulse: 86   Patient Position - Orthostatic VS: Sitting         Visual Acuity      ED Medications  Medications   ketorolac (TORADOL) injection 15 mg (15 mg Intramuscular Given 3/20/23 1358)   acetaminophen (TYLENOL) tablet 975 mg (975 mg Oral Given 3/20/23 1358)   morphine injection 4 mg (4 mg Intramuscular Given 3/20/23 1500)       Diagnostic Studies  Results Reviewed     None               XR forearm 2 views RIGHT   ED Interpretation by Oksana Donald MD (03/20 1446)   No acute osseous abnl                 Procedures  Procedures       ED Course         SBIRT 20yo+    Flowsheet Row Most Recent Value   SBIRT (23 yo +)    In order to provide better care to our patients, we are screening all of our patients for alcohol and drug use  Would it be okay to ask you these screening questions? No Filed at: 03/20/2023 1329          Medical Decision Making  48M presenting with right forearm pain  No signs of trauma, rash, or redness  No sensory deficits  Xray of right forearm independently reviewed and interpreted by me as no acute abnl  Suspect pain related to tendonitis vs MSK strain from heavy lifting at work  Patient as given IM analgesics for pain control  Advised keeping sports medicine appt to follow-up  Discharged in stable condition  Right arm pain: chronic illness or injury with exacerbation, progression, or side effects of treatment  Amount and/or Complexity of Data Reviewed  Radiology: ordered and independent interpretation performed  Risk  OTC drugs  Prescription drug management        Disposition  Final diagnoses:   Right arm pain     Time reflects when diagnosis was documented in both MDM as applicable and the Disposition within this note     Time User Action Codes Description Comment    3/20/2023  2:54 PM Ayala Lane Add [R15 828] Right arm pain       ED Disposition     ED Disposition   Discharge    Condition   Stable    Date/Time   Mon Mar 20, 2023  2:54 PM    Comment   Reese Hebert discharge to home/self care  Follow-up Information     Follow up With Specialties Details Why [address]zy Wagner MD Sports Medicine   [address] [facility]  [address]  [address] [facility]  [phone]            Discharge Medication List as of 3/20/2023  2:56 PM      START taking these medications    Details   !! naproxen (Naprosyn) 500 mg tablet Take 1 tablet (500 mg total) by mouth 2 (two) times a day with meals, Starting Mon 3/20/2023, Normal       !! - Potential duplicate medications found  Please discuss with provider  CONTINUE these medications which have NOT CHANGED    Details   Diclofenac Sodium (VOLTAREN) 1 % Apply 2 g topically 4 (four) times a day, Starting Fri 1/20/2023, Normal      lisinopril (ZESTRIL) 20 mg tablet 1 tab in am and pm, Historical Med      !! naproxen (NAPROSYN) 500 mg tablet Take 1 tablet (500 mg total) by mouth 2 (two) times a day with meals, Starting Fri 12/9/2022, Normal       !! - Potential duplicate medications found  Please discuss with provider  No discharge procedures on file      PDMP Review     None          ED Provider  Electronically Signed by           Pan Zaman MD  03/21/23 0001

## 2023-03-20 NOTE — DISCHARGE INSTRUCTIONS
Your imaging studies have been preliminarily reviewed by the emergency department  Further review by Radiology is pending at this time  If there is a discrepancy or a finding of additional concern identified, we will attempt to contact you at the number you have provided us  Follow-up with your orthopedic doctor   Your results may also be available on [facility]

## 2023-03-22 ENCOUNTER — OFFICE VISIT (OUTPATIENT)
Dept: OBGYN CLINIC | Facility: CLINIC | Age: 48
End: 2023-03-22

## 2023-03-22 VITALS
WEIGHT: 233 LBS | SYSTOLIC BLOOD PRESSURE: 138 MMHG | BODY MASS INDEX: 31.56 KG/M2 | HEIGHT: 72 IN | TEMPERATURE: 97.8 F | HEART RATE: 76 BPM | DIASTOLIC BLOOD PRESSURE: 82 MMHG

## 2023-03-22 DIAGNOSIS — M79.631 RIGHT FOREARM PAIN: Primary | ICD-10-CM

## 2023-03-22 DIAGNOSIS — S56.509A INJURY OF EXTENSOR MUSCLE AT FOREARM LEVEL: ICD-10-CM

## 2023-03-22 DIAGNOSIS — M77.11 LATERAL EPICONDYLITIS OF RIGHT ELBOW: ICD-10-CM

## 2023-03-23 NOTE — PROGRESS NOTES
1  Right forearm pain  MRI forearm right wo contrast      2  Lateral epicondylitis of right elbow  MRI forearm right wo contrast      3  Injury of extensor muscle at forearm level          Orders Placed This Encounter   Procedures   • MRI forearm right wo contrast        Imaging Studies (I personally reviewed images in PACS and report):    • X-ray right forearm 3/20/2023: No acute osseous abnormalities  No significant degenerative changes  Enthesophyte formation at the medial lateral epicondyle  • X-ray right elbow 12/13/2022: No acute osseous abnormalities  No significant degenerative changes  No joint effusion  IMPRESSION:  Acute on chronic right proximal forearm/elbow pain  Previously treated for lateral epicondylitis that had initially proved with therapy and bracing but has acutely worsened without a new injury  Pain localized to the proximal forearm and radiates down forearm during his occupation that involves repetitive motions of his upper extremities  DDx: Wrist extensor tendinosis, partial tear, lateral epicondylitis/medial epicondylitis given there are are enthesophytes noted on his most recent forearm imaging    Other factors:  • Right-hand-dominant; reportedly works in an occupation that involves repetitive lifting with his upper extremities for several hours a day  • BMI 32    PLAN:    • Clinical exam and radiographic imaging reviewed with patient today, with impression as per above  I have discussed with the patient the pathophysiology of this diagnosis and reviewed how the examination correlates with this diagnosis  • Given the recurrence of his right upper extremity pain that is more localized to his proximal forearm rather than his lateral epicondyle, I have ordered an MRI of his forearm without contrast for further evaluation    • I did consider lateral epicondyle cortisone injection but his pain is not localized over the expected area in regards to lateral epicondylitis so I will defer this for now until I can review his MRI  • Counseled transitioning out of his elbow strap and using a compression sleeve for now  The strap may be a contributing factor to why his pain is more localized to his proximal forearm rather than his elbow  • Recommended continued maintenance of his home exercise program   I did offer new referral to physical therapy but patient deferred this for now  • Furthermore, recommended heat/ice therapy 20 minutes on/off  • Offered work accommodations note, but patient declined  Return for Follow up after MRI of right forearm  Portions of the record may have been created with voice recognition software  Occasional wrong word or "sound a like" substitutions may have occurred due to the inherent limitations of voice recognition software  Read the chart carefully and recognize, using context, where substitutions have occurred  CHIEF COMPLAINT:  Chief Complaint   Patient presents with   • Follow-up         HPI:  Martin West is a 46 y o  male  who presents with his significant other in regards to    3/23/2023: Follow-up right elbow/forearm pain:  Patient was previously seen in the past for lateral epicondylitis that was treated conservatively with bracing and formal physical therapy  Patient eventually transition to a home exercise program and states he has been adherent  However, he feels his symptoms have been worsening since her last visit  He states has been ongoing for the past 4 weeks and severely worsened over the past 2 weeks  He denies any new precipitating injury  He does feel his occupation is a contributing factor as he is required to do repetitive motions of his upper extremities including repetitive pronation and supination of his forearms as well as extension/flexion of at his wrist   He states the pain is most prominent over the proximal aspect of his forearm and describes it as a throbbing/tingling/burning sensation    He states it can radiate down his forearm  He states the lateral aspect of his elbow is not as painful as the previously was that the pain is distal to this aspect of his elbow  Denies any arm swelling, discoloration  Denies any numbness of his hands or inability to   Denies any nighttime symptoms  He states the pain does improve with rest   He states he has been adherent using the elbow strap but has also been using a compression arm sleeve and has tried working without using any brace at all and still does not provide any relief  He has been taking NSAIDs and Tylenol without relief  He reports he has been using heat and ice without relief  He recently went to the ER and had imaging done of his forearm as noted above  Medical, Surgical, Family, and Social History    Past Medical History:   Diagnosis Date   • Hypertension      Past Surgical History:   Procedure Laterality Date   • NO PAST SURGERIES       Social History   Social History     Substance and Sexual Activity   Alcohol Use Yes     Social History     Substance and Sexual Activity   Drug Use Never     Social History     Tobacco Use   Smoking Status Never   Smokeless Tobacco Never     Family History   Problem Relation Age of Onset   • Arthritis Mother    • Cancer Father      No Known Allergies       Physical Exam  /82   Pulse 76   Temp 97 8 °F (36 6 °C) (Temporal)   Ht 6' (1 829 m)   Wt 106 kg (233 lb)   BMI 31 60 kg/m²     Constitutional:  see vital signs  Gen:obese, normocephalic/atraumatic, well-groomed  Eyes: No inflammation or discharge of conjunctiva or lids; sclera clear s  Pulmonary/Chest: Effort normal  No respiratory distress  Right Elbow Exam     Tenderness   Right elbow tenderness location: +proximal aspect of lateral forearm; +minimal to no pain of lateral epicondyle  Range of Motion   Extension: 0   Flexion: 140     Muscle Strength   Right elbow normal strength: Resisted pronation aggravates proximal forearm pain    Pronation:  5/5 Supination:  5/5     Tests   Varus: negative  Valgus: negative  Tinel's sign (cubital tunnel): negative    Other   Erythema: absent    Comments:   On inspection there is no swelling, erythema, ecchymosis    Cozen's tests: Denies exacerbation of pain  OK sign: intact  Froment Sign: intact  Thumb extension: 5/5: intact         strength 5 -/5 and aggravates proximal forearm pain  Wrist extension strength 5/5 and aggravated proximal forearm pain  Wrist flexion strength 5/5 and aggravates proximal forearm pain      Procedures Consent: The patient's consent was obtained including but not limited to risks of crusting, scabbing, blistering, scarring, darker or lighter pigmentary change, recurrence, incomplete removal and infection. Duration Of Freeze Thaw-Cycle (Seconds): 0 Detail Level: Detailed Render Post-Care Instructions In Note?: no Post-Care Instructions: I reviewed with the patient in detail post-care instructions. Patient is to wear sunprotection, and avoid picking at any of the treated lesions. Pt may apply Vaseline to crusted or scabbing areas.

## 2023-04-04 ENCOUNTER — HOSPITAL ENCOUNTER (OUTPATIENT)
Dept: MRI IMAGING | Facility: HOSPITAL | Age: 48
Discharge: HOME | End: 2023-04-04
Attending: STUDENT IN AN ORGANIZED HEALTH CARE EDUCATION/TRAINING PROGRAM

## 2023-04-04 DIAGNOSIS — M79.631 RIGHT FOREARM PAIN: ICD-10-CM

## 2023-04-04 DIAGNOSIS — M77.11 LATERAL EPICONDYLITIS OF RIGHT ELBOW: ICD-10-CM

## 2023-05-18 ENCOUNTER — TELEPHONE (OUTPATIENT)
Dept: OBGYN CLINIC | Facility: CLINIC | Age: 48
End: 2023-05-18

## 2023-05-18 NOTE — TELEPHONE ENCOUNTER
[facility] physical [facility] called asking if you would renew physical therapy order for him    Fax # [phone]

## 2023-05-24 DIAGNOSIS — M67.921 TENDINOPATHY OF RIGHT BICEPS TENDON: ICD-10-CM

## 2023-05-24 DIAGNOSIS — G89.29 CHRONIC PAIN OF RIGHT ELBOW: Primary | ICD-10-CM

## 2023-05-24 DIAGNOSIS — M79.631 RIGHT FOREARM PAIN: ICD-10-CM

## 2023-05-24 DIAGNOSIS — M25.521 CHRONIC PAIN OF RIGHT ELBOW: Primary | ICD-10-CM

## 2023-06-23 ENCOUNTER — HOSPITAL ENCOUNTER (OUTPATIENT)
Dept: RADIOLOGY | Facility: HOSPITAL | Age: 48
End: 2023-06-23
Payer: COMMERCIAL

## 2023-06-23 DIAGNOSIS — M99.04 SOMATIC DYSFUNCTION OF SACRAL REGION: ICD-10-CM

## 2023-06-23 DIAGNOSIS — M99.03 SOMATIC DYSFUNCTION OF LUMBAR REGION: ICD-10-CM

## 2023-06-23 PROCEDURE — 73502 X-RAY EXAM HIP UNI 2-3 VIEWS: CPT

## 2023-06-23 PROCEDURE — 72100 X-RAY EXAM L-S SPINE 2/3 VWS: CPT

## 2024-07-14 ENCOUNTER — APPOINTMENT (EMERGENCY)
Dept: CT IMAGING | Facility: HOSPITAL | Age: 49
End: 2024-07-14
Payer: COMMERCIAL

## 2024-07-14 ENCOUNTER — APPOINTMENT (EMERGENCY)
Dept: RADIOLOGY | Facility: HOSPITAL | Age: 49
End: 2024-07-14
Payer: COMMERCIAL

## 2024-07-14 ENCOUNTER — HOSPITAL ENCOUNTER (OUTPATIENT)
Facility: HOSPITAL | Age: 49
Setting detail: OBSERVATION
Discharge: HOME | End: 2024-07-16
Attending: EMERGENCY MEDICINE | Admitting: FAMILY MEDICINE
Payer: COMMERCIAL

## 2024-07-14 DIAGNOSIS — G45.4 TRANSIENT GLOBAL AMNESIA: ICD-10-CM

## 2024-07-14 DIAGNOSIS — R41.3 AMNESIA: Primary | ICD-10-CM

## 2024-07-14 PROBLEM — I10 HTN (HYPERTENSION): Status: ACTIVE | Noted: 2024-07-14

## 2024-07-14 LAB
ALBUMIN SERPL BCG-MCNC: 4.5 G/DL (ref 3.5–5)
ALP SERPL-CCNC: 71 U/L (ref 34–104)
ALT SERPL W P-5'-P-CCNC: 16 U/L (ref 7–52)
AMMONIA PLAS-SCNC: 18 UMOL/L (ref 18–72)
AMPHETAMINES SERPL QL SCN: NEGATIVE
ANION GAP SERPL CALCULATED.3IONS-SCNC: 10 MMOL/L (ref 4–13)
APTT PPP: 24 SECONDS (ref 23–37)
AST SERPL W P-5'-P-CCNC: 22 U/L (ref 13–39)
BARBITURATES UR QL: NEGATIVE
BASOPHILS # BLD AUTO: 0.08 THOUSANDS/ÂΜL (ref 0–0.1)
BASOPHILS NFR BLD AUTO: 1 % (ref 0–1)
BENZODIAZ UR QL: NEGATIVE
BILIRUB SERPL-MCNC: 0.74 MG/DL (ref 0.2–1)
BILIRUB UR QL STRIP: NEGATIVE
BUN SERPL-MCNC: 19 MG/DL (ref 5–25)
CALCIUM SERPL-MCNC: 9.3 MG/DL (ref 8.4–10.2)
CARDIAC TROPONIN I PNL SERPL HS: 3 NG/L
CHLORIDE SERPL-SCNC: 100 MMOL/L (ref 96–108)
CK SERPL-CCNC: 74 U/L (ref 39–308)
CLARITY UR: CLEAR
CO2 SERPL-SCNC: 25 MMOL/L (ref 21–32)
COCAINE UR QL: NEGATIVE
COLOR UR: YELLOW
CREAT SERPL-MCNC: 0.96 MG/DL (ref 0.6–1.3)
EOSINOPHIL # BLD AUTO: 0.02 THOUSAND/ÂΜL (ref 0–0.61)
EOSINOPHIL NFR BLD AUTO: 0 % (ref 0–6)
ERYTHROCYTE [DISTWIDTH] IN BLOOD BY AUTOMATED COUNT: 12.5 % (ref 11.6–15.1)
ETHANOL SERPL-MCNC: <10 MG/DL
FENTANYL UR QL SCN: NEGATIVE
GFR SERPL CREATININE-BSD FRML MDRD: 91 ML/MIN/1.73SQ M
GLUCOSE SERPL-MCNC: 97 MG/DL (ref 65–140)
GLUCOSE UR STRIP-MCNC: NEGATIVE MG/DL
HCT VFR BLD AUTO: 45.7 % (ref 36.5–49.3)
HGB BLD-MCNC: 15.8 G/DL (ref 12–17)
HGB UR QL STRIP.AUTO: NEGATIVE
HYDROCODONE UR QL SCN: NEGATIVE
IMM GRANULOCYTES # BLD AUTO: 0.04 THOUSAND/UL (ref 0–0.2)
IMM GRANULOCYTES NFR BLD AUTO: 0 % (ref 0–2)
INR PPP: 1 (ref 0.84–1.19)
KETONES UR STRIP-MCNC: NEGATIVE MG/DL
LACTATE SERPL-SCNC: 1.8 MMOL/L (ref 0.5–2)
LEUKOCYTE ESTERASE UR QL STRIP: NEGATIVE
LIPASE SERPL-CCNC: 12 U/L (ref 11–82)
LYMPHOCYTES # BLD AUTO: 1.33 THOUSANDS/ÂΜL (ref 0.6–4.47)
LYMPHOCYTES NFR BLD AUTO: 13 % (ref 14–44)
MAGNESIUM SERPL-MCNC: 2 MG/DL (ref 1.9–2.7)
MCH RBC QN AUTO: 31.2 PG (ref 26.8–34.3)
MCHC RBC AUTO-ENTMCNC: 34.6 G/DL (ref 31.4–37.4)
MCV RBC AUTO: 90 FL (ref 82–98)
METHADONE UR QL: NEGATIVE
MONOCYTES # BLD AUTO: 0.79 THOUSAND/ÂΜL (ref 0.17–1.22)
MONOCYTES NFR BLD AUTO: 8 % (ref 4–12)
NEUTROPHILS # BLD AUTO: 8.06 THOUSANDS/ÂΜL (ref 1.85–7.62)
NEUTS SEG NFR BLD AUTO: 78 % (ref 43–75)
NITRITE UR QL STRIP: NEGATIVE
NRBC BLD AUTO-RTO: 0 /100 WBCS
OPIATES UR QL SCN: NEGATIVE
OXYCODONE+OXYMORPHONE UR QL SCN: NEGATIVE
PCP UR QL: NEGATIVE
PH UR STRIP.AUTO: 7.5 [PH]
PLATELET # BLD AUTO: 299 THOUSANDS/UL (ref 149–390)
PMV BLD AUTO: 10.2 FL (ref 8.9–12.7)
POTASSIUM SERPL-SCNC: 4 MMOL/L (ref 3.5–5.3)
PROT SERPL-MCNC: 7.7 G/DL (ref 6.4–8.4)
PROT UR STRIP-MCNC: NEGATIVE MG/DL
PROTHROMBIN TIME: 13.5 SECONDS (ref 11.6–14.5)
RBC # BLD AUTO: 5.07 MILLION/UL (ref 3.88–5.62)
SODIUM SERPL-SCNC: 135 MMOL/L (ref 135–147)
SP GR UR STRIP.AUTO: 1.01 (ref 1–1.03)
THC UR QL: NEGATIVE
TSH SERPL DL<=0.05 MIU/L-ACNC: 1.23 UIU/ML (ref 0.45–4.5)
UROBILINOGEN UR QL STRIP.AUTO: 0.2 E.U./DL
WBC # BLD AUTO: 10.32 THOUSAND/UL (ref 4.31–10.16)

## 2024-07-14 PROCEDURE — 84484 ASSAY OF TROPONIN QUANT: CPT | Performed by: PHYSICIAN ASSISTANT

## 2024-07-14 PROCEDURE — 81003 URINALYSIS AUTO W/O SCOPE: CPT | Performed by: PHYSICIAN ASSISTANT

## 2024-07-14 PROCEDURE — 84443 ASSAY THYROID STIM HORMONE: CPT | Performed by: FAMILY MEDICINE

## 2024-07-14 PROCEDURE — 93005 ELECTROCARDIOGRAM TRACING: CPT

## 2024-07-14 PROCEDURE — 85025 COMPLETE CBC W/AUTO DIFF WBC: CPT | Performed by: PHYSICIAN ASSISTANT

## 2024-07-14 PROCEDURE — 71045 X-RAY EXAM CHEST 1 VIEW: CPT

## 2024-07-14 PROCEDURE — 85730 THROMBOPLASTIN TIME PARTIAL: CPT | Performed by: PHYSICIAN ASSISTANT

## 2024-07-14 PROCEDURE — 80053 COMPREHEN METABOLIC PANEL: CPT | Performed by: PHYSICIAN ASSISTANT

## 2024-07-14 PROCEDURE — 82140 ASSAY OF AMMONIA: CPT | Performed by: PHYSICIAN ASSISTANT

## 2024-07-14 PROCEDURE — 85610 PROTHROMBIN TIME: CPT | Performed by: PHYSICIAN ASSISTANT

## 2024-07-14 PROCEDURE — 83735 ASSAY OF MAGNESIUM: CPT | Performed by: PHYSICIAN ASSISTANT

## 2024-07-14 PROCEDURE — 96360 HYDRATION IV INFUSION INIT: CPT

## 2024-07-14 PROCEDURE — 70496 CT ANGIOGRAPHY HEAD: CPT

## 2024-07-14 PROCEDURE — 80307 DRUG TEST PRSMV CHEM ANLYZR: CPT | Performed by: PHYSICIAN ASSISTANT

## 2024-07-14 PROCEDURE — 82077 ASSAY SPEC XCP UR&BREATH IA: CPT | Performed by: PHYSICIAN ASSISTANT

## 2024-07-14 PROCEDURE — 83605 ASSAY OF LACTIC ACID: CPT | Performed by: PHYSICIAN ASSISTANT

## 2024-07-14 PROCEDURE — 99285 EMERGENCY DEPT VISIT HI MDM: CPT | Performed by: PHYSICIAN ASSISTANT

## 2024-07-14 PROCEDURE — 83690 ASSAY OF LIPASE: CPT | Performed by: PHYSICIAN ASSISTANT

## 2024-07-14 PROCEDURE — 99223 1ST HOSP IP/OBS HIGH 75: CPT | Performed by: FAMILY MEDICINE

## 2024-07-14 PROCEDURE — 82550 ASSAY OF CK (CPK): CPT | Performed by: PHYSICIAN ASSISTANT

## 2024-07-14 PROCEDURE — 70498 CT ANGIOGRAPHY NECK: CPT

## 2024-07-14 PROCEDURE — 36415 COLL VENOUS BLD VENIPUNCTURE: CPT | Performed by: PHYSICIAN ASSISTANT

## 2024-07-14 PROCEDURE — 99284 EMERGENCY DEPT VISIT MOD MDM: CPT

## 2024-07-14 RX ORDER — HYDRALAZINE HYDROCHLORIDE 20 MG/ML
5 INJECTION INTRAMUSCULAR; INTRAVENOUS ONCE
Status: COMPLETED | OUTPATIENT
Start: 2024-07-14 | End: 2024-07-14

## 2024-07-14 RX ORDER — HYDRALAZINE HYDROCHLORIDE 20 MG/ML
10 INJECTION INTRAMUSCULAR; INTRAVENOUS ONCE
Status: DISCONTINUED | OUTPATIENT
Start: 2024-07-14 | End: 2024-07-14

## 2024-07-14 RX ORDER — LISINOPRIL 20 MG/1
20 TABLET ORAL DAILY
Status: DISCONTINUED | OUTPATIENT
Start: 2024-07-15 | End: 2024-07-16 | Stop reason: HOSPADM

## 2024-07-14 RX ORDER — HYDRALAZINE HYDROCHLORIDE 20 MG/ML
5 INJECTION INTRAMUSCULAR; INTRAVENOUS EVERY 6 HOURS PRN
Status: DISCONTINUED | OUTPATIENT
Start: 2024-07-14 | End: 2024-07-16 | Stop reason: HOSPADM

## 2024-07-14 RX ORDER — ONDANSETRON 2 MG/ML
4 INJECTION INTRAMUSCULAR; INTRAVENOUS EVERY 6 HOURS PRN
Status: DISCONTINUED | OUTPATIENT
Start: 2024-07-14 | End: 2024-07-16 | Stop reason: HOSPADM

## 2024-07-14 RX ORDER — ACETAMINOPHEN 325 MG/1
650 TABLET ORAL EVERY 6 HOURS PRN
Status: DISCONTINUED | OUTPATIENT
Start: 2024-07-14 | End: 2024-07-16 | Stop reason: HOSPADM

## 2024-07-14 RX ORDER — LABETALOL HYDROCHLORIDE 5 MG/ML
10 INJECTION, SOLUTION INTRAVENOUS ONCE
Status: COMPLETED | OUTPATIENT
Start: 2024-07-14 | End: 2024-07-14

## 2024-07-14 RX ADMIN — IOHEXOL 100 ML: 350 INJECTION, SOLUTION INTRAVENOUS at 15:02

## 2024-07-14 RX ADMIN — HYDRALAZINE HYDROCHLORIDE 5 MG: 20 INJECTION INTRAMUSCULAR; INTRAVENOUS at 17:53

## 2024-07-14 RX ADMIN — SODIUM CHLORIDE 1000 ML: 0.9 INJECTION, SOLUTION INTRAVENOUS at 13:58

## 2024-07-14 RX ADMIN — LABETALOL HYDROCHLORIDE 10 MG: 5 INJECTION, SOLUTION INTRAVENOUS at 21:01

## 2024-07-14 RX ADMIN — ACETAMINOPHEN 325MG 650 MG: 325 TABLET ORAL at 20:13

## 2024-07-14 NOTE — PLAN OF CARE
Problem: PAIN - ADULT  Goal: Verbalizes/displays adequate comfort level or baseline comfort level  Description: Interventions:  - Encourage patient to monitor pain and request assistance  - Assess pain using appropriate pain scale  - Administer analgesics based on type and severity of pain and evaluate response  - Implement non-pharmacological measures as appropriate and evaluate response  - Consider cultural and social influences on pain and pain management  - Notify physician/advanced practitioner if interventions unsuccessful or patient reports new pain  Outcome: Progressing     Problem: INFECTION - ADULT  Goal: Absence or prevention of progression during hospitalization  Description: INTERVENTIONS:  - Assess and monitor for signs and symptoms of infection  - Monitor lab/diagnostic results  - Monitor all insertion sites, i.e. indwelling lines, tubes, and drains  - Monitor endotracheal if appropriate and nasal secretions for changes in amount and color  - Henderson appropriate cooling/warming therapies per order  - Administer medications as ordered  - Instruct and encourage patient and family to use good hand hygiene technique  - Identify and instruct in appropriate isolation precautions for identified infection/condition  Outcome: Progressing  Goal: Absence of fever/infection during neutropenic period  Description: INTERVENTIONS:  - Monitor WBC    Outcome: Progressing     Problem: SAFETY ADULT  Goal: Patient will remain free of falls  Description: INTERVENTIONS:  - Educate patient/family on patient safety including physical limitations  - Instruct patient to call for assistance with activity   - Consult OT/PT to assist with strengthening/mobility   - Keep Call bell within reach  - Keep bed low and locked with side rails adjusted as appropriate  - Keep care items and personal belongings within reach  - Initiate and maintain comfort rounds  - Make Fall Risk Sign visible to staff  - Offer Toileting every 2 Hours,  in advance of need  - Initiate/Maintain alarm  - Obtain necessary fall risk management equipment:   - Apply yellow socks and bracelet for high fall risk patients  - Consider moving patient to room near nurses station  Outcome: Progressing  Goal: Maintain or return to baseline ADL function  Description: INTERVENTIONS:  -  Assess patient's ability to carry out ADLs; assess patient's baseline for ADL function and identify physical deficits which impact ability to perform ADLs (bathing, care of mouth/teeth, toileting, grooming, dressing, etc.)  - Assess/evaluate cause of self-care deficits   - Assess range of motion  - Assess patient's mobility; develop plan if impaired  - Assess patient's need for assistive devices and provide as appropriate  - Encourage maximum independence but intervene and supervise when necessary  - Involve family in performance of ADLs  - Assess for home care needs following discharge   - Consider OT consult to assist with ADL evaluation and planning for discharge  - Provide patient education as appropriate  Outcome: Progressing  Goal: Maintains/Returns to pre admission functional level  Description: INTERVENTIONS:  - Perform AM-PAC 6 Click Basic Mobility/ Daily Activity assessment daily.  - Set and communicate daily mobility goal to care team and patient/family/caregiver.   - Collaborate with rehabilitation services on mobility goals if consulted  - Perform Range of Motion  times a day.  - Reposition patient every  hours.  - Dangle patient  times a day  - Stand patient  times a day  - Ambulate patient 3 times a day  - Out of bed to chair 3 times a day   - Out of bed for meals 3 times a day  - Out of bed for toileting  - Record patient progress and toleration of activity level   Outcome: Progressing     Problem: DISCHARGE PLANNING  Goal: Discharge to home or other facility with appropriate resources  Description: INTERVENTIONS:  - Identify barriers to discharge w/patient and caregiver  - Arrange  for needed discharge resources and transportation as appropriate  - Identify discharge learning needs (meds, wound care, etc.)  - Arrange for interpretive services to assist at discharge as needed  - Refer to Case Management Department for coordinating discharge planning if the patient needs post-hospital services based on physician/advanced practitioner order or complex needs related to functional status, cognitive ability, or social support system  Outcome: Progressing

## 2024-07-14 NOTE — ASSESSMENT & PLAN NOTE
Restart lisinopril 20 mg daily from tomorrow and add hydralazine as needed if needed if SBP is greater than 160.

## 2024-07-14 NOTE — H&P
[facility]  H&P  Name: Geronimo Gamboa 49 y.o. male I MRN: [id number]  Unit/Bed#: TR 13A I Date of Admission: 7/14/2024   Date of Service: 7/14/2024 I Hospital Day: 0      Assessment & Plan   * Transient global amnesia  Assessment & Plan  All started today at 11 with a memory loss and repetitive questions.  Patient had an excessive amount to drink yesterday at the pool which she never did and mixed beer with whiskey.  CT of the brain is negative for now all workup was negative.  Highly suspect this could be a transient global amnesia secondary to excessive alcohol.  But will need to rule out stroke seizure neurology recommended MRI of the brain with and without contrast seizure protocol and EEG.  Discussed with wife and the patient and in agreement.  Will add TSH.   Formal neurology consult.    HTN (hypertension)  Assessment & Plan  Restart lisinopril 20 mg daily from tomorrow and add hydralazine as needed if needed if SBP is greater than 160.           VTE Pharmacologic Prophylaxis: VTE Score: 1 Low Risk (Score 0-2) - Encourage Ambulation.  Code Status: Level 1 - Full Code   Discussion with family: Updated  (wife) at bedside.    Anticipated Length of Stay: Patient will be admitted on an inpatient basis with an anticipated length of stay of greater than 2 midnights secondary to transient global amnesia.    Total Time Spent on Date of Encounter in care of patient: >45 mins. This time was spent on one or more of the following: performing physical exam; counseling and coordination of care; obtaining or reviewing history; documenting in the medical record; reviewing/ordering tests, medications or procedures; communicating with other healthcare professionals and discussing with patient's family/caregivers.    Chief Complaint: Memory loss    History of Present Illness:  Geronimo Gamboa is a 49 y.o. male with a PMH of hypertension who presents with memory loss.  Patient has been going  through a lot of stress but he was at the pool yesterday having fun he drank 1612 ounce beers and then 4 shots of [employer] throughout the day.  Was acting okay went to sleep okay woke up today his wife went to charge which she received a call from her mother stating that he is not acting right he is not remembering what happened yesterday he could not remember that he went to the concert last week he is repetitive asking questions.  But he did remember that he takes lisinopril and that it is 20 mg for hypertension.  He did not fall there is no nausea no vomiting.   He never drink in excess amount like this or mixed drinks  No previous seizure-like activity    Review of Systems:  Review of Systems   Constitutional:  Negative for chills and fever.   HENT:  Negative for ear pain and sore throat.    Eyes:  Negative for pain and visual disturbance.   Respiratory:  Negative for cough and shortness of breath.    Cardiovascular:  Negative for chest pain and palpitations.   Gastrointestinal:  Negative for abdominal pain and vomiting.   Genitourinary:  Negative for dysuria and hematuria.   Musculoskeletal:  Negative for arthralgias and back pain.   Skin:  Negative for color change and rash.   Neurological:  Negative for seizures and syncope.   Psychiatric/Behavioral:  Positive for confusion.    All other systems reviewed and are negative.      Past Medical and Surgical History:   Past Medical History:   Diagnosis Date    Hypertension        Past Surgical History:   Procedure Laterality Date    NO PAST SURGERIES         Meds/Allergies:  Prior to Admission medications    Medication Sig Start Date End Date Taking? Authorizing Provider   Diclofenac Sodium (VOLTAREN) 1 % Apply 2 g topically 4 (four) times a day 1/20/23   Keron Merino MD   lisinopril (ZESTRIL) 20 mg tablet 1 tab in am and pm 7/26/22   Historical Provider, MD   naproxen (NAPROSYN) 500 mg tablet Take 1 tablet (500 mg total) by mouth 2 (two) times a day with  meals  Patient not taking: Reported on 3/22/2023 12/9/22   Keron Merino MD   naproxen (Naprosyn) 500 mg tablet Take 1 tablet (500 mg total) by mouth 2 (two) times a day with meals 3/20/23   Elizabeth Cha MD     I have reviewed home medications with patient personally.    Allergies: No Known Allergies    Social History:  Marital Status: /Civil Union   Substance Use History:   Social History     Substance and Sexual Activity   Alcohol Use Yes    Comment: Social     Social History     Tobacco Use   Smoking Status Never   Smokeless Tobacco Never     Social History     Substance and Sexual Activity   Drug Use Never       Family History:  Family History   Problem Relation Age of Onset    Arthritis Mother     Cancer Father        Physical Exam:     Vitals:   Blood Pressure: (!) 156/101 (07/14/24 1715)  Pulse: 82 (07/14/24 1715)  Temperature: 97.7 °F (36.5 °C) (07/14/24 1348)  Temp Source: Temporal (07/14/24 1348)  Respirations: 20 (07/14/24 1715)  Weight - Scale: 106 kg (233 lb 0.4 oz) (07/14/24 1348)  SpO2: 100 % (07/14/24 1715)    Physical Exam  Vitals and nursing note reviewed.   Constitutional:       General: He is not in acute distress.     Appearance: He is well-developed.   HENT:      Head: Normocephalic and atraumatic.   Eyes:      Conjunctiva/sclera: Conjunctivae normal.   Cardiovascular:      Rate and Rhythm: Normal rate and regular rhythm.      Heart sounds: No murmur heard.  Pulmonary:      Effort: Pulmonary effort is normal. No respiratory distress.      Breath sounds: Normal breath sounds. No wheezing or rales.   Abdominal:      General: Bowel sounds are normal. There is no distension.      Palpations: Abdomen is soft.      Tenderness: There is no abdominal tenderness.   Musculoskeletal:         General: No swelling.      Cervical back: Neck supple.   Skin:     General: Skin is warm and dry.      Capillary Refill: Capillary refill takes less than 2 seconds.   Neurological:      General: No focal  "deficit present.      Mental Status: He is alert and oriented to person, place, and time.   Psychiatric:         Mood and Affect: Mood normal.          Additional Data:     Lab Results:  Results from last 7 days   Lab Units 07/14/24  1357   WBC Thousand/uL 10.32*   HEMOGLOBIN g/dL 15.8   HEMATOCRIT % 45.7   PLATELETS Thousands/uL 299   SEGS PCT % 78*   LYMPHO PCT % 13*   MONO PCT % 8   EOS PCT % 0     Results from last 7 days   Lab Units 07/14/24  1357   SODIUM mmol/L 135   POTASSIUM mmol/L 4.0   CHLORIDE mmol/L 100   CO2 mmol/L 25   BUN mg/dL 19   CREATININE mg/dL 0.96   ANION GAP mmol/L 10   CALCIUM mg/dL 9.3   ALBUMIN g/dL 4.5   TOTAL BILIRUBIN mg/dL 0.74   ALK PHOS U/L 71   ALT U/L 16   AST U/L 22   GLUCOSE RANDOM mg/dL 97     Results from last 7 days   Lab Units 07/14/24  1357   INR  1.00         No results found for: \"HGBA1C\"  Results from last 7 days   Lab Units 07/14/24  1357   LACTIC ACID mmol/L 1.8       Lines/Drains:  Invasive Devices       Peripheral Intravenous Line  Duration             Peripheral IV 07/14/24 Left Antecubital <1 day                        Imaging: Reviewed radiology reports from this admission including: CT head  CTA head and neck with and without contrast   Final Result by Ruperto Saunders MD (07/14 1529)      CT Brain:   - No acute intracranial abnormality given beam-hardening streak artifact limiting evaluation of the maddie.      CT Angiography:   - Negative CTA head and neck for large vessel occlusion, dissection, aneurysm, or high-grade stenosis.   - Mild atherosclerotic disease of the head and neck, as detailed above.      Additional chronic/incidental findings as detailed above.                  Workstation performed: [id number]         XR chest 1 view portable    (Results Pending)   MRI inpatient order    (Results Pending)       EKG and Other Studies Reviewed on Admission:   EKG: NSR. HR 77.    ** Please Note: This note has been constructed using a voice recognition " system. **

## 2024-07-14 NOTE — LETTER
[facility] MED SURG UNIT  [address] [id number]  No information on file.    July 16, 2024     Patient: Geronimo Gamboa   YOB: 1975   Date of Visit: 7/14/2024       To Whom it May Concern:    Geronimo Gamboa is under my professional care. He was seen in the hospital from 7/14/2024 to 07/16/24. He may return to work on 7/18/24 without limitations.    If you have any questions or concerns, please don't hesitate to call.         Sincerely,          Kelley Rick MD

## 2024-07-14 NOTE — ASSESSMENT & PLAN NOTE
All started today at 11 with a memory loss and repetitive questions.  Patient had an excessive amount to drink yesterday at the pool which she never did and mixed beer with whiskey.  CT of the brain is negative for now all workup was negative.  Highly suspect this could be a transient global amnesia secondary to excessive alcohol.  But will need to rule out stroke seizure neurology recommended MRI of the brain with and without contrast seizure protocol and EEG.  Discussed with wife and the patient and in agreement.  Will add TSH.   Formal neurology consult.

## 2024-07-14 NOTE — ED ATTENDING ATTESTATION
7/14/2024  I, Wm Delatorre MD, saw and evaluated the patient. I have discussed the patient with the resident/non-physician practitioner and agree with the resident's/non-physician practitioner's findings, Plan of Care, and MDM as documented in the resident's/non-physician practitioner's note, except where noted. All available labs and Radiology studies were reviewed.  I was present for key portions of any procedure(s) performed by the resident/non-physician practitioner and I was immediately available to provide assistance.       At this point I agree with the current assessment done in the Emergency Department.  I have conducted an independent evaluation of this patient a history and physical is as follows:    ED Course     Patient drank a lot yesterday.  Now here because not acting normally.  Does not recall any of the events from yesterday.  Does not recall any events from Friday.  No headaches.  No chest pain.  No weakness or numbness in arm or leg.  No change in speech or vision.    On exam the patient is awake alert.  Nontoxic-appearing.  Lungs clear to auscultation.  Heart is a regular rate and rhythm.  Neurologic exam is nonfocal.    Critical Care Time  Procedures

## 2024-07-14 NOTE — ED PROVIDER NOTES
"History  Chief Complaint   Patient presents with    Alcohol Intoxication     Patient's wife reports patient was out at the pool yesterday and drank an excessive amount of beer and [place]. Patient \"blacked out.\" Has no memory of yesterday, does not remember going to sleep. Wife reports he was not acting himself. Is not a daily drinker. Denies pain, vomiting, nausea, HA.      Patient is a 49-year-old male history of hypertension who presents emerged from today with spouse for the concern of amnesia.  The patient was drinking with friends yesterday drank an excessive amount.  Patient does not usually drink and is not a daily drinker.  Patient remember many events over the last few days.  Has been excessively asking questions and repeatedly asking questions wife is concerned.    Patient denies any current pain, headache blurred vision numbness tingling weakness.  Alert and oriented currently.          Prior to Admission Medications   Prescriptions Last Dose Informant Patient Reported? Taking?   Diclofenac Sodium (VOLTAREN) 1 %   No No   Sig: Apply 2 g topically 4 (four) times a day   lisinopril (ZESTRIL) 20 mg tablet 2024  Yes Yes   Si tab in am and pm   naproxen (NAPROSYN) 500 mg tablet Not Taking  No No   Sig: Take 1 tablet (500 mg total) by mouth 2 (two) times a day with meals   Patient not taking: Reported on 3/22/2023   naproxen (Naprosyn) 500 mg tablet Not Taking  No No   Sig: Take 1 tablet (500 mg total) by mouth 2 (two) times a day with meals   Patient not taking: Reported on 2024      Facility-Administered Medications: None       Past Medical History:   Diagnosis Date    Hypertension        Past Surgical History:   Procedure Laterality Date    NO PAST SURGERIES         Family History   Problem Relation Age of Onset    Arthritis Mother     Cancer Father      I have reviewed and agree with the history as documented.    E-Cigarette/Vaping    E-Cigarette Use Never User      E-Cigarette/Vaping " Substances     Social History     Tobacco Use    Smoking status: Never    Smokeless tobacco: Never   Vaping Use    Vaping status: Never Used   Substance Use Topics    Alcohol use: Yes     Comment: Social    Drug use: Never       Review of Systems   All other systems reviewed and are negative.      Physical Exam  Physical Exam  Vitals and nursing note reviewed.   Constitutional:       General: He is not in acute distress.     Appearance: He is well-developed.   HENT:      Head: Normocephalic and atraumatic.      Mouth/Throat:      Mouth: Oropharynx is clear and moist.   Eyes:      Extraocular Movements: Extraocular movements intact and EOM normal.      Pupils: Pupils are equal, round, and reactive to light.   Cardiovascular:      Rate and Rhythm: Regular rhythm. Tachycardia present.      Heart sounds: No murmur heard.  Pulmonary:      Effort: Pulmonary effort is normal. No respiratory distress.      Breath sounds: Normal breath sounds.   Abdominal:      General: Bowel sounds are normal.      Palpations: Abdomen is soft.      Tenderness: There is no abdominal tenderness.   Musculoskeletal:      Cervical back: Normal range of motion.   Skin:     General: Skin is warm and dry.      Capillary Refill: Capillary refill takes less than 2 seconds.   Neurological:      General: No focal deficit present.      Mental Status: He is alert and oriented to person, place, and time.      Gait: Gait is intact.   Psychiatric:         Mood and Affect: Mood and affect normal.         Behavior: Behavior normal.         Vital Signs  ED Triage Vitals [07/14/24 1348]   Temperature Pulse Respirations Blood Pressure SpO2   97.7 °F (36.5 °C) 81 18 (!) 178/112 100 %      Temp Source Heart Rate Source Patient Position - Orthostatic VS BP Location FiO2 (%)   Temporal Monitor Lying Right arm --      Pain Score       No Pain           Vitals:    07/14/24 1715 07/14/24 1745 07/14/24 1800 07/14/24 1822   BP: (!) 156/101 (!) 164/107 (!) 158/103 150/100    Pulse: 82 83 88 82   Patient Position - Orthostatic VS: Lying Lying Lying          Visual Acuity  Visual Acuity      Flowsheet Row Most Recent Value   L Pupil Size (mm) 4   R Pupil Size (mm) 4   L Pupil Shape Round   R Pupil Shape Round            ED Medications  Medications   lisinopril (ZESTRIL) tablet 20 mg (has no administration in time range)   acetaminophen (TYLENOL) tablet 650 mg (has no administration in time range)   ondansetron (ZOFRAN) injection 4 mg (has no administration in time range)   hydrALAZINE (APRESOLINE) injection 5 mg (has no administration in time range)   sodium chloride 0.9 % bolus 1,000 mL (0 mL Intravenous Stopped 7/14/24 1458)   iohexol (OMNIPAQUE) 350 MG/ML injection (MULTI-DOSE) 100 mL (100 mL Intravenous Given 7/14/24 1502)   hydrALAZINE (APRESOLINE) injection 5 mg (5 mg Intravenous Given 7/14/24 1753)       Diagnostic Studies  Results Reviewed       Procedure Component Value Units Date/Time    TSH, 3rd generation [[id number]]  (Normal) Collected: 07/14/24 1357    Lab Status: Final result Specimen: Blood from Arm, Left Updated: 07/14/24 1818     TSH 3RD GENERATON 1.233 uIU/mL     Rapid drug screen, urine [[id number]]  (Normal) Collected: 07/14/24 1645    Lab Status: Final result Specimen: Urine, Clean Catch Updated: 07/14/24 1708     Amph/Meth UR Negative     Barbiturate Ur Negative     Benzodiazepine Urine Negative     Cocaine Urine Negative     Methadone Urine Negative     Opiate Urine Negative     PCP Ur Negative     THC Urine Negative     Oxycodone Urine Negative     Fentanyl Urine Negative     HYDROCODONE URINE Negative    Narrative:      FOR MEDICAL PURPOSES ONLY.   IF CONFIRMATION NEEDED PLEASE CONTACT THE LAB WITHIN 5 DAYS.    Drug Screen Cutoff Levels:  AMPHETAMINE/METHAMPHETAMINES  1000 ng/mL  BARBITURATES     200 ng/mL  BENZODIAZEPINES     200 ng/mL  COCAINE      300 ng/mL  METHADONE      300 ng/mL  OPIATES      300 ng/mL  PHENCYCLIDINE     25 ng/mL  THC       50  ng/mL  OXYCODONE      100 ng/mL  FENTANYL      5 ng/mL  HYDROCODONE     300 ng/mL    HS Troponin 0hr (reflex protocol) [[id number]]  (Normal) Collected: 07/14/24 1357    Lab Status: Final result Specimen: Blood from Arm, Left Updated: 07/14/24 1430     hs TnI 0hr 3 ng/L     Lactic acid, plasma (w/reflex if result > 2.0) [[id number]]  (Normal) Collected: 07/14/24 1357    Lab Status: Final result Specimen: Blood from Arm, Left Updated: 07/14/24 1426     LACTIC ACID 1.8 mmol/L     Narrative:      Result may be elevated if tourniquet was used during collection.    Ammonia [[id number]]  (Normal) Collected: 07/14/24 1357    Lab Status: Final result Specimen: Blood from Arm, Left Updated: 07/14/24 1425     Ammonia 18 umol/L     Comprehensive metabolic panel [[id number]] Collected: 07/14/24 1357    Lab Status: Final result Specimen: Blood from Arm, Left Updated: 07/14/24 1425     Sodium 135 mmol/L      Potassium 4.0 mmol/L      Chloride 100 mmol/L      CO2 25 mmol/L      ANION GAP 10 mmol/L      BUN 19 mg/dL      Creatinine 0.96 mg/dL      Glucose 97 mg/dL      Calcium 9.3 mg/dL      AST 22 U/L      ALT 16 U/L      Alkaline Phosphatase 71 U/L      Total Protein 7.7 g/dL      Albumin 4.5 g/dL      Total Bilirubin 0.74 mg/dL      eGFR 91 ml/min/1.73sq m     Narrative:      [employer] guidelines for Chronic Kidney Disease (CKD):     Stage 1 with normal or high GFR (GFR > 90 mL/min/1.73 square meters)    Stage 2 Mild CKD (GFR = 60-89 mL/min/1.73 square meters)    Stage 3A Moderate CKD (GFR = 45-59 mL/min/1.73 square meters)    Stage 3B Moderate CKD (GFR = 30-44 mL/min/1.73 square meters)    Stage 4 Severe CKD (GFR = 15-29 mL/min/1.73 square meters)    Stage 5 End Stage CKD (GFR <15 mL/min/1.73 square meters)  Note: GFR calculation is accurate only with a steady state creatinine    Magnesium [[id number]]  (Normal) Collected: 07/14/24 1357    Lab Status: Final result Specimen: Blood from Arm, Left Updated:  07/14/24 1425     Magnesium 2.0 mg/dL     CK [[id number]]  (Normal) Collected: 07/14/24 1357    Lab Status: Final result Specimen: Blood from Arm, Left Updated: 07/14/24 1425     Total CK 74 U/L     Lipase [[id number]]  (Normal) Collected: 07/14/24 1357    Lab Status: Final result Specimen: Blood from Arm, Left Updated: 07/14/24 1425     Lipase 12 u/L     Protime-INR [[id number]]  (Normal) Collected: 07/14/24 1357    Lab Status: Final result Specimen: Blood from Arm, Left Updated: 07/14/24 1421     Protime 13.5 seconds      INR 1.00    APTT [[id number]]  (Normal) Collected: 07/14/24 1357    Lab Status: Final result Specimen: Blood from Arm, Left Updated: 07/14/24 1421     PTT 24 seconds     Ethanol [[id number]]  (Normal) Collected: 07/14/24 1357    Lab Status: Final result Specimen: Blood from Arm, Left Updated: 07/14/24 1421     Ethanol Lvl <10 mg/dL     UA w Reflex to Microscopic w Reflex to Culture [[id number]] Collected: 07/14/24 1358    Lab Status: Final result Specimen: Urine, Clean Catch Updated: 07/14/24 1410     Color, UA Yellow     Clarity, UA Clear     Specific Gravity, UA 1.015     pH, UA 7.5     Leukocytes, UA Negative     Nitrite, UA Negative     Protein, UA Negative mg/dl      Glucose, UA Negative mg/dl      Ketones, UA Negative mg/dl      Urobilinogen, UA 0.2 E.U./dl      Bilirubin, UA Negative     Occult Blood, UA Negative    CBC and differential [[id number]]  (Abnormal) Collected: 07/14/24 1357    Lab Status: Final result Specimen: Blood from Arm, Left Updated: 07/14/24 1408     WBC 10.32 Thousand/uL      RBC 5.07 Million/uL      Hemoglobin 15.8 g/dL      Hematocrit 45.7 %      MCV 90 fL      MCH 31.2 pg      MCHC 34.6 g/dL      RDW 12.5 %      MPV 10.2 fL      Platelets 299 Thousands/uL      nRBC 0 /100 WBCs      Segmented % 78 %      Immature Grans % 0 %      Lymphocytes % 13 %      Monocytes % 8 %      Eosinophils Relative 0 %      Basophils Relative 1 %      Absolute Neutrophils 8.06 Thousands/µL       "Absolute Immature Grans 0.04 Thousand/uL      Absolute Lymphocytes 1.33 Thousands/µL      Absolute Monocytes 0.79 Thousand/µL      Eosinophils Absolute 0.02 Thousand/µL      Basophils Absolute 0.08 Thousands/µL                    CTA head and neck with and without contrast   Final Result by Ruperto Saunders MD (07/14 1529)      CT Brain:   - No acute intracranial abnormality given beam-hardening streak artifact limiting evaluation of the maddie.      CT Angiography:   - Negative CTA head and neck for large vessel occlusion, dissection, aneurysm, or high-grade stenosis.   - Mild atherosclerotic disease of the head and neck, as detailed above.      Additional chronic/incidental findings as detailed above.                  Workstation performed: [id number]         XR chest 1 view portable   Final Result by Aurora Thomas MD (07/14 1804)      No acute cardiopulmonary disease.            Workstation performed: [id number]         MRI inpatient order    (Results Pending)              Procedures  ECG 12 Lead Documentation Only    Date/Time: 7/14/2024 2:18 PM    Performed by: Trenton Little PA-C  Authorized by: Trenton Little PA-C    Indications / Diagnosis:  Confusion  ECG reviewed by me, the ED Provider: yes    Patient location:  ED  Previous ECG:     Previous ECG:  Unavailable  Interpretation:     Interpretation: normal    Rate:     ECG rate:  77    ECG rate assessment: normal    Rhythm:     Rhythm: sinus rhythm             ED Course  ED Course as of 07/14/24 1904   Sun Jul 14, 2024   1627 Neurology recommendations   1639 Corrigan aware    1724 \" MRI brain w/wo seizure protocol and EEG\" and inpatient stay.  Will admit under observation with Slim.                                 SBIRT 20yo+      Flowsheet Row Most Recent Value   Initial Alcohol Screen: US AUDIT-C     1. How often do you have a drink containing alcohol? 3 Filed at: 07/14/2024 1351   2. How many drinks containing alcohol do you have on a " typical day you are drinking?  2 Filed at: 07/14/2024 1351   3a. Male UNDER 65: How often do you have five or more drinks on one occasion? 1 Filed at: 07/14/2024 1351   Audit-C Score 6 Filed at: 07/14/2024 1351   MADHURI: How many times in the past year have you...    Used an illegal drug or used a prescription medication for non-medical reasons? Never Filed at: 07/14/2024 1351                      Medical Decision Making  Patient is a 49-year-old male history of hypertension who presents emerged from today with spouse for the concern of amnesia.  The patient was drinking with friends yesterday drank an excessive amount.  Patient does not usually drink and is not a daily drinker.  Patient remember many events over the last few days.  Has been excessively asking questions and repeatedly asking questions wife is concerned.    Patient denies any current pain, headache blurred vision numbness tingling weakness.  Alert and oriented currently.    Patient has no current neurologic deficit.  Alert and oriented x 3.  Does not recall anything recent, is asking repeated questions about recent things that happened in the ER.  Does not remember driving here.  Does have long-term memory.  Lab work unremarkable CTA unremarkable spoke with neurology as above and then admitted the patient to medicine service for further evaluation and treatment.    Amount and/or Complexity of Data Reviewed  Labs: ordered. Decision-making details documented in ED Course.  Radiology: ordered and independent interpretation performed. Decision-making details documented in ED Course.  ECG/medicine tests: ordered and independent interpretation performed. Decision-making details documented in ED Course.    Risk  Prescription drug management.  Decision regarding hospitalization.                 Disposition  Final diagnoses:   Amnesia     Time reflects when diagnosis was documented in both MDM as applicable and the Disposition within this note       Time User  Action Codes Description Comment    7/14/2024  4:40 PM Trenton Little [R41.3] Amnesia     7/14/2024  5:43 PM Teri Carvajal [G45.4] Transient global amnesia           ED Disposition       ED Disposition   Admit    Condition   Stable    Date/Time   Sun Jul 14, 2024 1720    Comment   Case was discussed with efren  and the patient's admission status was agreed to be observation to the service of Dr. Carvajal  .               Follow-up Information    None         Current Discharge Medication List        CONTINUE these medications which have NOT CHANGED    Details   lisinopril (ZESTRIL) 20 mg tablet 1 tab in am and pm      Diclofenac Sodium (VOLTAREN) 1 % Apply 2 g topically 4 (four) times a day  Qty: 100 g, Refills: 3    Associated Diagnoses: Lateral epicondylitis of right elbow; Right elbow pain      !! naproxen (NAPROSYN) 500 mg tablet Take 1 tablet (500 mg total) by mouth 2 (two) times a day with meals  Qty: 60 tablet, Refills: 0    Associated Diagnoses: Right elbow pain; Lateral epicondylitis of right elbow      !! naproxen (Naprosyn) 500 mg tablet Take 1 tablet (500 mg total) by mouth 2 (two) times a day with meals  Qty: 30 tablet, Refills: 0    Associated Diagnoses: Right arm pain       !! - Potential duplicate medications found. Please discuss with provider.          No discharge procedures on file.    PDMP Review       None            ED Provider  Electronically Signed by             Trenton Little PA-C  07/14/24 7307

## 2024-07-15 ENCOUNTER — APPOINTMENT (OUTPATIENT)
Dept: MRI IMAGING | Facility: HOSPITAL | Age: 49
End: 2024-07-15
Payer: COMMERCIAL

## 2024-07-15 ENCOUNTER — APPOINTMENT (OUTPATIENT)
Dept: NEUROLOGY | Facility: HOSPITAL | Age: 49
End: 2024-07-15
Attending: FAMILY MEDICINE
Payer: COMMERCIAL

## 2024-07-15 LAB
ATRIAL RATE: 77 BPM
BASOPHILS # BLD AUTO: 0.07 THOUSANDS/ÂΜL (ref 0–0.1)
BASOPHILS NFR BLD AUTO: 1 % (ref 0–1)
EOSINOPHIL # BLD AUTO: 0.1 THOUSAND/ÂΜL (ref 0–0.61)
EOSINOPHIL NFR BLD AUTO: 2 % (ref 0–6)
ERYTHROCYTE [DISTWIDTH] IN BLOOD BY AUTOMATED COUNT: 12.9 % (ref 11.6–15.1)
HCT VFR BLD AUTO: 41.5 % (ref 36.5–49.3)
HGB BLD-MCNC: 13.9 G/DL (ref 12–17)
IMM GRANULOCYTES # BLD AUTO: 0.02 THOUSAND/UL (ref 0–0.2)
IMM GRANULOCYTES NFR BLD AUTO: 0 % (ref 0–2)
LYMPHOCYTES # BLD AUTO: 2 THOUSANDS/ÂΜL (ref 0.6–4.47)
LYMPHOCYTES NFR BLD AUTO: 30 % (ref 14–44)
MCH RBC QN AUTO: 31.3 PG (ref 26.8–34.3)
MCHC RBC AUTO-ENTMCNC: 33.5 G/DL (ref 31.4–37.4)
MCV RBC AUTO: 94 FL (ref 82–98)
MONOCYTES # BLD AUTO: 0.71 THOUSAND/ÂΜL (ref 0.17–1.22)
MONOCYTES NFR BLD AUTO: 11 % (ref 4–12)
NEUTROPHILS # BLD AUTO: 3.87 THOUSANDS/ÂΜL (ref 1.85–7.62)
NEUTS SEG NFR BLD AUTO: 56 % (ref 43–75)
NRBC BLD AUTO-RTO: 0 /100 WBCS
P AXIS: 39 DEGREES
PLATELET # BLD AUTO: 241 THOUSANDS/UL (ref 149–390)
PMV BLD AUTO: 9.8 FL (ref 8.9–12.7)
PR INTERVAL: 152 MS
QRS AXIS: 6 DEGREES
QRSD INTERVAL: 88 MS
QT INTERVAL: 378 MS
QTC INTERVAL: 427 MS
RBC # BLD AUTO: 4.44 MILLION/UL (ref 3.88–5.62)
T WAVE AXIS: 12 DEGREES
VENTRICULAR RATE: 77 BPM
WBC # BLD AUTO: 6.77 THOUSAND/UL (ref 4.31–10.16)

## 2024-07-15 PROCEDURE — A9585 GADOBUTROL INJECTION: HCPCS | Performed by: FAMILY MEDICINE

## 2024-07-15 PROCEDURE — 85025 COMPLETE CBC W/AUTO DIFF WBC: CPT | Performed by: FAMILY MEDICINE

## 2024-07-15 PROCEDURE — 99245 OFF/OP CONSLTJ NEW/EST HI 55: CPT | Performed by: PSYCHIATRY & NEUROLOGY

## 2024-07-15 PROCEDURE — G0425 INPT/ED TELECONSULT30: HCPCS | Performed by: PSYCHIATRY & NEUROLOGY

## 2024-07-15 PROCEDURE — 70553 MRI BRAIN STEM W/O & W/DYE: CPT

## 2024-07-15 PROCEDURE — 95816 EEG AWAKE AND DROWSY: CPT

## 2024-07-15 PROCEDURE — 95816 EEG AWAKE AND DROWSY: CPT | Performed by: PSYCHIATRY & NEUROLOGY

## 2024-07-15 PROCEDURE — 93010 ELECTROCARDIOGRAM REPORT: CPT | Performed by: INTERNAL MEDICINE

## 2024-07-15 PROCEDURE — 99232 SBSQ HOSP IP/OBS MODERATE 35: CPT | Performed by: FAMILY MEDICINE

## 2024-07-15 RX ORDER — GADOBUTROL 604.72 MG/ML
10 INJECTION INTRAVENOUS
Status: COMPLETED | OUTPATIENT
Start: 2024-07-15 | End: 2024-07-15

## 2024-07-15 RX ORDER — AMLODIPINE BESYLATE 5 MG/1
5 TABLET ORAL EVERY EVENING
Status: DISCONTINUED | OUTPATIENT
Start: 2024-07-15 | End: 2024-07-15

## 2024-07-15 RX ADMIN — GADOBUTROL 10 ML: 604.72 INJECTION INTRAVENOUS at 13:43

## 2024-07-15 RX ADMIN — LISINOPRIL 20 MG: 20 TABLET ORAL at 08:01

## 2024-07-15 RX ADMIN — ACETAMINOPHEN 325MG 650 MG: 325 TABLET ORAL at 18:41

## 2024-07-15 NOTE — PLAN OF CARE
Problem: PAIN - ADULT  Goal: Verbalizes/displays adequate comfort level or baseline comfort level  Description: Interventions:  - Encourage patient to monitor pain and request assistance  - Assess pain using appropriate pain scale  - Administer analgesics based on type and severity of pain and evaluate response  - Implement non-pharmacological measures as appropriate and evaluate response  - Consider cultural and social influences on pain and pain management  - Notify physician/advanced practitioner if interventions unsuccessful or patient reports new pain  Outcome: Progressing     Problem: INFECTION - ADULT  Goal: Absence or prevention of progression during hospitalization  Description: INTERVENTIONS:  - Assess and monitor for signs and symptoms of infection  - Monitor lab/diagnostic results  - Monitor all insertion sites, i.e. indwelling lines, tubes, and drains  - Monitor endotracheal if appropriate and nasal secretions for changes in amount and color  - Palmer appropriate cooling/warming therapies per order  - Administer medications as ordered  - Instruct and encourage patient and family to use good hand hygiene technique  - Identify and instruct in appropriate isolation precautions for identified infection/condition  Outcome: Progressing  Goal: Absence of fever/infection during neutropenic period  Description: INTERVENTIONS:  - Monitor WBC    Outcome: Progressing     Problem: SAFETY ADULT  Goal: Patient will remain free of falls  Description: INTERVENTIONS:  - Educate patient/family on patient safety including physical limitations  - Instruct patient to call for assistance with activity   - Consult OT/PT to assist with strengthening/mobility   - Keep Call bell within reach  - Keep bed low and locked with side rails adjusted as appropriate  - Keep care items and personal belongings within reach  - Initiate and maintain comfort rounds  - Make Fall Risk Sign visible to staff  - Offer Toileting every 2 Hours,  in advance of need  - Obtain necessary fall risk management equipment  - Apply yellow socks and bracelet for high fall risk patients  - Consider moving patient to room near nurses station  Outcome: Progressing  Goal: Maintain or return to baseline ADL function  Description: INTERVENTIONS:  -  Assess patient's ability to carry out ADLs; assess patient's baseline for ADL function and identify physical deficits which impact ability to perform ADLs (bathing, care of mouth/teeth, toileting, grooming, dressing, etc.)  - Assess/evaluate cause of self-care deficits   - Assess range of motion  - Assess patient's mobility; develop plan if impaired  - Assess patient's need for assistive devices and provide as appropriate  - Encourage maximum independence but intervene and supervise when necessary  - Involve family in performance of ADLs  - Assess for home care needs following discharge   - Consider OT consult to assist with ADL evaluation and planning for discharge  - Provide patient education as appropriate  Outcome: Progressing  Goal: Maintains/Returns to pre admission functional level  Description: INTERVENTIONS:  - Perform AM-PAC 6 Click Basic Mobility/ Daily Activity assessment daily.  - Set and communicate daily mobility goal to care team and patient/family/caregiver.   - Collaborate with rehabilitation services on mobility goals if consulted  - Reposition patient every 2 hours.  - Stand patient 3 times a day  - Ambulate patient 3 times a day  - Out of bed to chair 3 times a day   - Out of bed for meals 3 times a day  - Out of bed for toileting  - Record patient progress and toleration of activity level   Outcome: Progressing     Problem: DISCHARGE PLANNING  Goal: Discharge to home or other facility with appropriate resources  Description: INTERVENTIONS:  - Identify barriers to discharge w/patient and caregiver  - Arrange for needed discharge resources and transportation as appropriate  - Identify discharge learning  needs (meds, wound care, etc.)  - Arrange for interpretive services to assist at discharge as needed  - Refer to Case Management Department for coordinating discharge planning if the patient needs post-hospital services based on physician/advanced practitioner order or complex needs related to functional status, cognitive ability, or social support system  Outcome: Progressing     Problem: Knowledge Deficit  Goal: Patient/family/caregiver demonstrates understanding of disease process, treatment plan, medications, and discharge instructions  Description: Complete learning assessment and assess knowledge base.  Interventions:  - Provide teaching at level of understanding  - Provide teaching via preferred learning methods  Outcome: Progressing

## 2024-07-15 NOTE — ASSESSMENT & PLAN NOTE
All started today at 11 with a memory loss and repetitive questions.  Patient had an excessive amount to drink yesterday at the pool which she never did and mixed beer with whiskey.  CT of the brain is negative for now all workup was negative.  Highly suspect this could be a transient global amnesia secondary to excessive alcohol.  But will need to rule out stroke seizure neurology recommended MRI of the brain with and without contrast seizure protocol and EEG.  Discussed with wife and the patient and in agreement.  TSH is normal there is no infection  Formal neurology consult.  7/15 his memory is much better today he is able to remember now what happened Saturday where he went so it is improving.

## 2024-07-15 NOTE — DISCHARGE INSTR - OTHER ORDERS
You were provided multiple handouts for ETOH resources and mental health. Please follow up with your PCP.  Please utilize the resources as needed.

## 2024-07-15 NOTE — CASE MANAGEMENT
Case Management Assessment & Discharge Planning Note    Patient name Geronimo Dias  Location /-01 MRN [id number]  : 1975 Date 7/15/2024       Current Admission Date: 2024  Current Admission Diagnosis:Transient global amnesia   Patient Active Problem List    Diagnosis Date Noted Date Diagnosed    Transient global amnesia 2024     HTN (hypertension) 2024       LOS (days): 0  Geometric Mean LOS (GMLOS) (days):   Days to GMLOS:     OBJECTIVE:              Current admission status: Observation  Referral Reason: Drug/Alcohol Abuse (dc planing)    Preferred Pharmacy:   [facility]/pharmacy #[id number] - [place], [place] - [address] [address]  Phone: [phone] Fax: [phone]    [facility] - [facility] [address] [facility]  [address] [facility] [address]  Phone: [phone] Fax: [phone]    Primary Care Provider: Bryan Cabral DO    Primary Insurance: BLUE CROSS  Secondary Insurance:     CM met with patient and spouse at the bedside,baseline information  was obtained. CM discussed the role of CM in helping the patient develop a discharge plan and assist the patient in carry out their plan.      ASSESSMENT:  Active Health Care Proxies    There are no active Health Care Proxies on file.       Advance Directives  Does patient have a Health Care POA?: No  Was patient offered paperwork?: Yes (provided)  Does patient currently have a Health Care decision maker?: Yes, please see Health Care Proxy section  Does patient have Advance Directives?: No  Was patient offered paperwork?: Yes (provided)  Primary Contact: yovana dias (Spouse)  [phone]         Readmission Root Cause  30 Day Readmission: No    Patient Information  Admitted from:: Home  Mental Status: Alert  During Assessment patient was accompanied by: Spouse  Assessment information provided by:: Patient  Primary Caregiver: Self  Support Systems:  Spouse/significant other  County of Residence: [facility]  What city do you live in?: [facility]  Home entry access options. Select all that apply.: Stairs  Number of steps to enter home.: 4  Do the steps have railings?: Yes  Type of Current Residence: [facility]  Living Arrangements: Lives w/ Spouse/significant other  Is patient a ?: No    Activities of Daily Living Prior to Admission  Functional Status: Independent  Completes ADLs independently?: Yes  Ambulates independently?: Yes  Does patient use assisted devices?: No  Does patient currently own DME?: No  Does patient have a history of Outpatient Therapy (PT/OT)?: No  Does the patient have a history of Short-Term Rehab?: No  Does patient have a history of HHC?: No  Does patient currently have HHC?: No         Patient Information Continued  Income Source: Employed  Does patient have prescription coverage?: No  Does patient receive dialysis treatments?: No  Does patient have a history of substance abuse?: Currently using  Current substance use preference: Alcohol/ETOH  History of Withdrawal Symptoms: Denies past symptoms  Is patient currently in treatment for substance abuse?: No. Patient declined treatment information.  Does patient have a history of Mental Health Diagnosis?: No         Means of Transportation  Means of Transport to Appts:: Drives Self      Social Determinants of Health (SDOH)      Flowsheet Row Most Recent Value   Housing Stability    In the last 12 months, was there a time when you were not able to pay the mortgage or rent on time? N   In the past 12 months, how many times have you moved where you were living? 0   At any time in the past 12 months, were you homeless or living in a shelter (including now)? N   Transportation Needs    In the past 12 months, has lack of transportation kept you from medical appointments or from getting medications? no   In the past 12 months, has lack of transportation kept you from meetings, work, or from getting  things needed for daily living? No   Food Insecurity    Within the past 12 months, you worried that your food would run out before you got the money to buy more. Never true   Within the past 12 months, the food you bought just didn't last and you didn't have money to get more. Never true   Utilities    In the past 12 months has the electric, gas, oil, or water company threatened to shut off services in your home? No            DISCHARGE DETAILS:    Discharge planning discussed with:: patient  Freedom of Choice: Yes  Comments - Freedom of Choice: Discussed dc and potential needs. Pt verbalized he has been ^ drinking lately, maybe 3 beers a night. Pt admits he had alot more day before admission. We discussed etoh cessation and the next step is to speak to a representator in [facility] to etoh cessation. Pt is not interested in this at this time. Pt is agreeable for resources, this was provided, for [employer] and [facility]. Mrs Gamboa discussed that she noted an ^ as well related to his mothers recent dx of dementia. CM also provided information on OP Mental Health Options.  CM contacted family/caregiver?: Yes  Were Treatment Team discharge recommendations reviewed with patient/caregiver?: Yes          Contacts  Patient Contacts: yovana (Spouse)  Relationship to Patient:: Family  Contact Method: In Person  Reason/Outcome: Discharge Planning, Continuity of Care    Requested Home Health Care         Is the patient interested in HHC at discharge?: No    DME Referral Provided  Referral made for DME?: No    Other Referral/Resources/Interventions Provided:  Interventions: D&A Warm Handoff  Referral Comments: Resources were provided         Treatment Team Recommendation: Home  Discharge Destination Plan:: Home  Transport at Discharge : Family      Current dc plan is home, no needs.

## 2024-07-15 NOTE — TELEMEDICINE
TeleConsultation - Neurology   Geronimo Gamboa 49 y.o. male MRN: [id number]  Unit/Bed#: -01 Encounter: [id number]      VIRTUAL CARE DOCUMENTATION:     1. This service was provided via Telemedicine using [employer]     2. Parties in the room with patient during teleconsult Patient only    3. Confidentiality My office door was closed     4. Participants No one else was in the room    5. Patient acknowledged consent and understanding of privacy and security of the  Telemedicine consult. I informed the patient that I have reviewed their record in Epic and presented the opportunity for them to ask any questions regarding the visit today.  The patient agreed to participate.    6. Time spent 40 min       Assessment & Plan   TGA. Minimal vascular risk factors htn only and not accelerated at time of admission. No prior such episode. No visible seizure like activity. No new meds. No obvious toxic/metabolic derangements. Vitals stable, unlikely hypertensive encephalopathy.  Nonorganic etiology is a strong possibility however stronger suspicion heat/alcohol playing a more prominent role. Unlikely a complex migraine.  Mild leukocytosis yesterday now resolved. UDS negative. No hx of seizures.      Mri brain with and without contrast  Eeg  Continue evaluating for toxic/metabolic derangements              History of Present Illness     Reason for Consult / Principal Problem: TGA    HPI: Geronimo Gamboa is a 49 y.o.  male who presented yesterday to [facility] with acute memory loss and repetitive questions.  He was at the swimming pool most of the day Saturday and later that day went to a party and was drinking alcohol throghout the day. No head trauma. Last drink around 10 pm Saturday night. Wife was not at the pool or party but was with him when he went to bed that night and she didn't notice anything unusual. Sunday morning she woke up and told him she was going to [employer]. Minimal communication but he seemed okay and she  also called him once from [place] and seemed okay. When she got home shortly after noon her mother was expressing concern about him. He was confused, unable to recall any events of Saturday and kept repeating questions after given the answer by them. Mild headache Saturday night took a tylenol before going to bed but no headache Sunday. He has been going through a lot of stressors at home.  He does not drink regularly. No pain complaints.   Ethanol level less than 10 in ER. Cta head/neck no significant stenosis.    Inpatient consult to Neurology  Consult performed by: Werner Veloz MD  Consult ordered by: Teri Carvajal MD           Review of Systems  Per 12 point review see hpi, currently mild HA in forehead, rest negative  Historical Information   Past Medical History:   Diagnosis Date    Hypertension      Past Surgical History:   Procedure Laterality Date    NO PAST SURGERIES       Social History   Social History     Substance and Sexual Activity   Alcohol Use Yes    Comment: Social     Social History     Substance and Sexual Activity   Drug Use Never     E-Cigarette/Vaping    E-Cigarette Use Never User      E-Cigarette/Vaping Substances     Social History     Tobacco Use   Smoking Status Never   Smokeless Tobacco Never     Family History: non-contributory      Meds/Allergies   all current active meds have been reviewed    No Known Allergies    Objective   Vitals:Blood pressure 125/84, pulse 92, temperature 97.5 °F (36.4 °C), resp. rate 16, height 6' (1.829 m), weight 105 kg (232 lb 6.4 oz), SpO2 95%.,Body mass index is 31.52 kg/m².    Intake/Output Summary (Last 24 hours) at 7/15/2024 1057  Last data filed at 7/15/2024 0900  Gross per 24 hour   Intake 1780 ml   Output 1450 ml   Net 330 ml         Physical Exam  General: no acute distress  HEENT: Atraumatic, normocephalic    Neurologic Exam  MS: Alert and oriented x 3.  Insight concentration and attention intact.  No expressive or receptive aphasia.  CN:  Cranial nerves II to XII intact  Motor: Normal bulk throughout.  No involuntary movements at rest or activation.  At least 3 power upper and lower extremities bilaterally.  Additional practitioner not present to assess the individual muscles.  Sensory: Light touch intact upper and lower extremities bilaterally  Coordination: Finger-nose heel-to-shin is no dysmetria or ataxia upper and lower extremities bilaterally            Lab Results: I have personally reviewed pertinent reports.    Imaging Studies: I have personally reviewed pertinent films in PACS  EKG, Pathology, and Other Studies: I have personally reviewed pertinent films in PACS

## 2024-07-15 NOTE — PROGRESS NOTES
[facility]  Progress Note  Name: Geronimo Gamboa I  MRN: [id number]  Unit/Bed#: -Miguel I Date of Admission: 7/14/2024   Date of Service: 7/15/2024 I Hospital Day: 0    Assessment & Plan   * Transient global amnesia  Assessment & Plan  All started today at 11 with a memory loss and repetitive questions.  Patient had an excessive amount to drink yesterday at the pool which she never did and mixed beer with whiskey.  CT of the brain is negative for now all workup was negative.  Highly suspect this could be a transient global amnesia secondary to excessive alcohol.  But will need to rule out stroke seizure neurology recommended MRI of the brain with and without contrast seizure protocol and EEG.  Discussed with wife and the patient and in agreement.  TSH is normal there is no infection  Formal neurology consult.  7/15 his memory is much better today he is able to remember now what happened Saturday where he went so it is improving.    HTN (hypertension)  Assessment & Plan  Blood pressure improved now that he is less stressed continue his lisinopril 20 mg daily                 VTE Pharmacologic Prophylaxis: VTE Score: 1 Low Risk (Score 0-2) - Encourage Ambulation.    Mobility:   Basic Mobility Inpatient Raw Score: 24  JH-HLM Goal: 8: Walk 250 feet or more  JH-HLM Achieved: 8: Walk 250 feet ot more  JH-HLM Goal achieved. Continue to encourage appropriate mobility.    Patient Centered Rounds: I performed bedside rounds with nursing staff today.   Discussions with Specialists or Other Care Team Provider: will discuss with neuro     Education and Discussions with Family / Patient: Updated  (wife) at bedside.    Total Time Spent on Date of Encounter in care of patient: >35 mins. This time was spent on one or more of the following: performing physical exam; counseling and coordination of care; obtaining or reviewing history; documenting in the medical record; reviewing/ordering  tests, medications or procedures; communicating with other healthcare professionals and discussing with patient's family/caregivers.    Current Length of Stay: 0 day(s)  Current Patient Status: Observation   Certification Statement: The patient will continue to require additional inpatient hospital stay due to MRI and EEG to be done  Discharge Plan: Anticipate discharge later today or tomorrow to home.    Code Status: Level 1 - Full Code    Subjective:   Patient seen and examined discussed with wife he is doing a little better his memory is coming back    Objective:     Vitals:   Temp (24hrs), Av.7 °F (36.5 °C), Min:97.5 °F (36.4 °C), Max:97.7 °F (36.5 °C)    Temp:  [97.5 °F (36.4 °C)-97.7 °F (36.5 °C)] 97.5 °F (36.4 °C)  HR:  [] 92  Resp:  [15-20] 16  BP: (125-178)/() 125/84  SpO2:  [95 %-100 %] 95 %  Body mass index is 31.52 kg/m².     Input and Output Summary (last 24 hours):     Intake/Output Summary (Last 24 hours) at 7/15/2024 1209  Last data filed at 7/15/2024 0900  Gross per 24 hour   Intake 1780 ml   Output 1450 ml   Net 330 ml       Physical Exam:   Physical Exam  Vitals and nursing note reviewed.   Constitutional:       General: He is not in acute distress.     Appearance: He is well-developed.   HENT:      Head: Normocephalic and atraumatic.   Eyes:      Conjunctiva/sclera: Conjunctivae normal.   Cardiovascular:      Rate and Rhythm: Normal rate and regular rhythm.      Heart sounds: No murmur heard.  Pulmonary:      Effort: Pulmonary effort is normal. No respiratory distress.      Breath sounds: Normal breath sounds. No wheezing or rales.   Abdominal:      General: Bowel sounds are normal. There is no distension.      Palpations: Abdomen is soft.      Tenderness: There is no abdominal tenderness.   Musculoskeletal:         General: No swelling.      Cervical back: Neck supple.   Skin:     General: Skin is warm and dry.      Capillary Refill: Capillary refill takes less than 2 seconds.    Neurological:      Mental Status: He is alert and oriented to person, place, and time.   Psychiatric:         Mood and Affect: Mood normal.          Additional Data:     Labs:  Results from last 7 days   Lab Units 07/15/24  0458   WBC Thousand/uL 6.77   HEMOGLOBIN g/dL 13.9   HEMATOCRIT % 41.5   PLATELETS Thousands/uL 241   SEGS PCT % 56   LYMPHO PCT % 30   MONO PCT % 11   EOS PCT % 2     Results from last 7 days   Lab Units 07/14/24  1357   SODIUM mmol/L 135   POTASSIUM mmol/L 4.0   CHLORIDE mmol/L 100   CO2 mmol/L 25   BUN mg/dL 19   CREATININE mg/dL 0.96   ANION GAP mmol/L 10   CALCIUM mg/dL 9.3   ALBUMIN g/dL 4.5   TOTAL BILIRUBIN mg/dL 0.74   ALK PHOS U/L 71   ALT U/L 16   AST U/L 22   GLUCOSE RANDOM mg/dL 97     Results from last 7 days   Lab Units 07/14/24  1357   INR  1.00             Results from last 7 days   Lab Units 07/14/24  1357   LACTIC ACID mmol/L 1.8       Lines/Drains:  Invasive Devices       Peripheral Intravenous Line  Duration             Peripheral IV 07/14/24 Left Antecubital <1 day                          Imaging: Reviewed radiology reports from this admission including: CT head    Recent Cultures (last 7 days):         Last 24 Hours Medication List:   Current Facility-Administered Medications   Medication Dose Route Frequency Provider Last Rate    acetaminophen  650 mg Oral Q6H PRN Teri Carvajal MD      hydrALAZINE  5 mg Intravenous Q6H PRN Teri Carvajal MD      lisinopril  20 mg Oral Daily Teri Carvajal MD      ondansetron  4 mg Intravenous Q6H PRN Teri Carvajal MD          Today, Patient Was Seen By: Teri Carvajal MD    **Please Note: This note may have been constructed using a voice recognition system.**

## 2024-07-15 NOTE — UTILIZATION REVIEW
"Initial Clinical Review    Admission: Date/Time/Statement:   Admission Orders (From admission, onward)       Ordered        07/14/24 1728  Place in Observation  Once                          Orders Placed This Encounter   Procedures    Place in Observation     Standing Status:   Standing     Number of Occurrences:   1     Order Specific Question:   Level of Care     Answer:   Med Surg [16]     ED Arrival Information       Expected   -    Arrival   7/14/2024 13:33    Acuity   Urgent              Means of arrival   Walk-In    Escorted by   Spouse    Service   Hospitalist    Admission type   Emergency              Arrival complaint   Alcohol Intoxication             Chief Complaint   Patient presents with    Alcohol Intoxication     Patient's wife reports patient was out at the pool yesterday and drank an excessive amount of beer and [place]. Patient \"blacked out.\" Has no memory of yesterday, does not remember going to sleep. Wife reports he was not acting himself. Is not a daily drinker. Denies pain, vomiting, nausea, HA.        Initial Presentation: 49 y.o. male to ED via walk-in from home  Present to ED with memory loss.  All started today at 11 with a memory loss and repetitive questions. Patient had an excessive amount to drink yesterday at the pool which she never did and mixed beer with whiskey. Highly suspect this could be a transient global amnesia secondary to excessive alcohol. But will need to rule out stroke   PMHX: hypertension    Admitted to OBS with DX: Transient global amnesia   on exam: hypertensive; WBC 10.32  PLAN: Cardiopulmonary monitoring; rec'd labetalol iv x1; f/u MRI; consult neurology; f/u TSH; monitor labs      Anticipated Length of Stay/Certification Statement: Patient will be admitted on an inpatient basis with an anticipated length of stay of greater than 2 midnights secondary to transient global amnesia.         ED Triage Vitals [07/14/24 1348]   Temperature Pulse Respirations Blood " Pressure SpO2 Pain Score   97.7 °F (36.5 °C) 81 18 (!) 178/112 100 % No Pain     Weight (last 2 days)       Date/Time Weight    07/14/24 1800 105 (232.4)    07/14/24 1348 106 (233.03)            Vital Signs (last 3 days)       Date/Time Temp Pulse Resp BP MAP (mmHg) SpO2 O2 Device Patient Position - Orthostatic VS [place] Coma Scale Score Pain    07/15/24 0803 -- -- -- -- -- 99 % None (Room air) -- 15 No Pain    07/15/24 08:00:36 97.5 °F (36.4 °C) 75 16 143/102 116 96 % -- -- -- --    07/14/24 2255 97.7 °F (36.5 °C) -- 15 126/87 100 -- -- -- -- --    07/14/24 20:20:12 -- 102 -- 165/112 130 97 % -- -- -- --    07/14/24 2013 -- -- -- -- -- -- -- -- -- 3    07/14/24 18:22:54 97.7 °F (36.5 °C) 82 18 150/100 117 97 % None (Room air) -- -- No Pain    07/14/24 1812 -- -- -- -- -- -- -- -- 15 --    07/14/24 1800 -- 88 20 158/103 126 98 % None (Room air) Lying -- --    07/14/24 1745 -- 83 20 164/107 131 99 % None (Room air) Lying -- --    07/14/24 1715 -- 82 20 156/101 124 100 % None (Room air) Lying -- --    07/14/24 1700 -- 90 20 161/100 125 99 % None (Room air) Lying -- --    07/14/24 1600 -- 79 20 144/95 114 98 % None (Room air) Lying -- --    07/14/24 1541 -- 66 20 145/97 -- 100 % None (Room air) Lying -- --    07/14/24 1530 -- 71 20 164/101 127 100 % None (Room air) Lying -- --    07/14/24 1430 -- 79 20 162/96 122 100 % None (Room air) Lying -- --    07/14/24 1415 -- 84 18 168/103 131 100 % None (Room air) Lying -- --    07/14/24 1403 -- -- -- -- -- -- None (Room air) -- 14 No Pain    07/14/24 1350 -- -- -- -- -- 100 % -- -- -- --    07/14/24 1348 97.7 °F (36.5 °C) 81 18 178/112 138 100 % None (Room air) Lying -- No Pain              Pertinent Labs/Diagnostic Test Results:   Radiology:  CTA head and neck with and without contrast   Final Interpretation by Ruperto Saunders MD (07/14 1409)      CT Brain:   - No acute intracranial abnormality given beam-hardening streak artifact limiting evaluation of the maddie.       CT Angiography:   - Negative CTA head and neck for large vessel occlusion, dissection, aneurysm, or high-grade stenosis.   - Mild atherosclerotic disease of the head and neck, as detailed above.      Additional chronic/incidental findings as detailed above.                  Workstation performed: [id number]         XR chest 1 view portable   Final Interpretation by Aurora Thomas MD (07/14 1804)      No acute cardiopulmonary disease.            Workstation performed: [id number]         MRI inpatient order    (Results Pending)     Cardiology:  ECG 12 lead   Final Result by Yogi Antunez MD (07/15 0800)   Normal sinus rhythm   Normal ECG   When compared with ECG of 21-AUG-2021 09:27,   No significant change was found   Confirmed by Yogi Antunez ([id number]) on 7/15/2024 8:00:39 AM           Results from last 7 days   Lab Units 07/15/24  0458 07/14/24  1357   WBC Thousand/uL 6.77 10.32*   HEMOGLOBIN g/dL 13.9 15.8   HEMATOCRIT % 41.5 45.7   PLATELETS Thousands/uL 241 299   TOTAL NEUT ABS Thousands/µL 3.87 8.06*        Results from last 7 days   Lab Units 07/14/24  1357   SODIUM mmol/L 135   POTASSIUM mmol/L 4.0   CHLORIDE mmol/L 100   CO2 mmol/L 25   ANION GAP mmol/L 10   BUN mg/dL 19   CREATININE mg/dL 0.96   EGFR ml/min/1.73sq m 91   CALCIUM mg/dL 9.3   MAGNESIUM mg/dL 2.0     Results from last 7 days   Lab Units 07/14/24  1357   AST U/L 22   ALT U/L 16   ALK PHOS U/L 71   TOTAL PROTEIN g/dL 7.7   ALBUMIN g/dL 4.5   TOTAL BILIRUBIN mg/dL 0.74   AMMONIA umol/L 18        Results from last 7 days   Lab Units 07/14/24  1357   GLUCOSE RANDOM mg/dL 97        Results from last 7 days   Lab Units 07/14/24  1357   CK TOTAL U/L 74     Results from last 7 days   Lab Units 07/14/24  1357   HS TNI 0HR ng/L 3        Results from last 7 days   Lab Units 07/14/24  1357   PROTIME seconds 13.5   INR  1.00   PTT seconds 24     Results from last 7 days   Lab Units 07/14/24  1357   TSH 3RD GENERATON uIU/mL 1.233        Results from last  7 days   Lab Units 07/14/24  1357   LACTIC ACID mmol/L 1.8        Results from last 7 days   Lab Units 07/14/24  1357   LIPASE u/L 12        Results from last 7 days   Lab Units 07/14/24  1358   CLARITY UA  Clear   COLOR UA  Yellow   SPEC GRAV UA  1.015   PH UA  7.5   GLUCOSE UA mg/dl Negative   KETONES UA mg/dl Negative   BLOOD UA  Negative   PROTEIN UA mg/dl Negative   NITRITE UA  Negative   BILIRUBIN UA  Negative   UROBILINOGEN UA E.U./dl 0.2   LEUKOCYTES UA  Negative             Results from last 7 days   Lab Units 07/14/24  1645   AMPH/METH  Negative   BARBITURATE UR  Negative   BENZODIAZEPINE UR  Negative   COCAINE UR  Negative   METHADONE URINE  Negative   OPIATE UR  Negative   PCP UR  Negative   THC UR  Negative     Results from last 7 days   Lab Units 07/14/24  1357   ETHANOL LVL mg/dL <10          ED Treatment-Medication Administration from 07/14/2024 1333 to 07/14/2024 1806         Date/Time Order Dose Route Action     07/14/2024 1358 sodium chloride 0.9 % bolus 1,000 mL 1,000 mL Intravenous New Bag     07/14/2024 1502 iohexol (OMNIPAQUE) 350 MG/ML injection (MULTI-DOSE) 100 mL 100 mL Intravenous Given     07/14/2024 1753 hydrALAZINE (APRESOLINE) injection 5 mg 5 mg Intravenous Given            Past Medical History:   Diagnosis Date    Hypertension           Admitting Diagnosis: Transient global amnesia [G45.4]  Alcohol intoxication (HCC) [F10.929]  Amnesia [R41.3]    Age/Sex: 49 y.o. male    Admission Orders: SCDs; I/O; Cardiopulmonary monitoring; regular diet    Scheduled Medications:  amLODIPine, 5 mg, Oral, QPM  lisinopril, 20 mg, Oral, Daily    labetalol (NORMODYNE) injection 10 mg  Dose: 10 mg  Freq: Once Route: IV  Start: 07/14/24 2045 End: 07/14/24 2101    Continuous IV Infusions: none       PRN Meds:  acetaminophen, 650 mg, Oral, Q6H PRN  (7/14 recd x1)   hydrALAZINE, 5 mg, Intravenous, Q6H PRN  ondansetron, 4 mg, Intravenous, Q6H PRN        IP CONSULT TO NEUROLOGY    Network Utilization Review  Department  ATTENTION: Please call with any questions or concerns to [phone] and carefully listen to the prompts so that you are directed to the right person. All voicemails are confidential.   For Discharge needs, contact Care Management DC Support Team at [phone] opt. 2  Send all requests for admission clinical reviews, approved or denied determinations and any other requests to dedicated fax number below belonging to the campus where the patient is receiving treatment. List of dedicated fax numbers for the Facilities:  FACILITY NAME UR FAX NUMBER   ADMISSION DENIALS (Administrative/Medical Necessity) [phone]   DISCHARGE SUPPORT TEAM (NETWORK) [phone]   PARENT CHILD HEALTH (Maternity/NICU/Pediatrics) [phone]   [facility] [phone]   [facility] [phone]   [facility] [phone]   [facility] [phone]   [facility] [phone]   [facility] [phone]   [facility] [phone]   [facility] [phone]   [facility] [phone]   [facility] [phone]   [facility] [phone]   [facility] [phone]

## 2024-07-15 NOTE — PLAN OF CARE
Problem: PAIN - ADULT  Goal: Verbalizes/displays adequate comfort level or baseline comfort level  Description: Interventions:  - Encourage patient to monitor pain and request assistance  - Assess pain using appropriate pain scale  - Administer analgesics based on type and severity of pain and evaluate response  - Implement non-pharmacological measures as appropriate and evaluate response  - Consider cultural and social influences on pain and pain management  - Notify physician/advanced practitioner if interventions unsuccessful or patient reports new pain  Outcome: Progressing     Problem: INFECTION - ADULT  Goal: Absence or prevention of progression during hospitalization  Description: INTERVENTIONS:  - Assess and monitor for signs and symptoms of infection  - Monitor lab/diagnostic results  - Monitor all insertion sites, i.e. indwelling lines, tubes, and drains  - Monitor endotracheal if appropriate and nasal secretions for changes in amount and color  - [place] appropriate cooling/warming therapies per order  - Administer medications as ordered  - Instruct and encourage patient and family to use good hand hygiene technique  - Identify and instruct in appropriate isolation precautions for identified infection/condition  Outcome: Progressing  Goal: Absence of fever/infection during neutropenic period  Description: INTERVENTIONS:  - Monitor WBC    Outcome: Progressing     Problem: SAFETY ADULT  Goal: Patient will remain free of falls  Description: INTERVENTIONS:  - Educate patient/family on patient safety including physical limitations  - Instruct patient to call for assistance with activity   - Consult OT/PT to assist with strengthening/mobility   - Keep Call bell within reach  - Keep bed low and locked with side rails adjusted as appropriate  - Keep care items and personal belongings within reach  - Initiate and maintain comfort rounds  - Make Fall Risk Sign visible to staff  - Offer Toileting every 2 Hours,  in advance of need  - Initiate/Maintain bed alarm  - Obtain necessary fall risk management equipment  - Apply yellow socks and bracelet for high fall risk patients  - Consider moving patient to room near nurses station  Outcome: Progressing  Goal: Maintain or return to baseline ADL function  Description: INTERVENTIONS:  -  Assess patient's ability to carry out ADLs; assess patient's baseline for ADL function and identify physical deficits which impact ability to perform ADLs (bathing, care of mouth/teeth, toileting, grooming, dressing, etc.)  - Assess/evaluate cause of self-care deficits   - Assess range of motion  - Assess patient's mobility; develop plan if impaired  - Assess patient's need for assistive devices and provide as appropriate  - Encourage maximum independence but intervene and supervise when necessary  - Involve family in performance of ADLs  - Assess for home care needs following discharge   - Consider OT consult to assist with ADL evaluation and planning for discharge  - Provide patient education as appropriate  Outcome: Progressing  Goal: Maintains/Returns to pre admission functional level  Description: INTERVENTIONS:  - Perform AM-PAC 6 Click Basic Mobility/ Daily Activity assessment daily.  - Set and communicate daily mobility goal to care team and patient/family/caregiver.   - Collaborate with rehabilitation services on mobility goals if consulted  - Perform Range of Motion 3 times a day.  - Reposition patient every 2 hours.  - Dangle patient 3 times a day  - Stand patient 3 times a day  - Ambulate patient 3 times a day  - Out of bed to chair 3 times a day   - Out of bed for meals 3 times a day  - Out of bed for toileting  - Record patient progress and toleration of activity level   Outcome: Progressing     Problem: DISCHARGE PLANNING  Goal: Discharge to home or other facility with appropriate resources  Description: INTERVENTIONS:  - Identify barriers to discharge w/patient and caregiver  -  Arrange for needed discharge resources and transportation as appropriate  - Identify discharge learning needs (meds, wound care, etc.)  - Arrange for interpretive services to assist at discharge as needed  - Refer to Case Management Department for coordinating discharge planning if the patient needs post-hospital services based on physician/advanced practitioner order or complex needs related to functional status, cognitive ability, or social support system  Outcome: Progressing     Problem: Knowledge Deficit  Goal: Patient/family/caregiver demonstrates understanding of disease process, treatment plan, medications, and discharge instructions  Description: Complete learning assessment and assess knowledge base.  Interventions:  - Provide teaching at level of understanding  - Provide teaching via preferred learning methods  Outcome: Progressing

## 2024-07-16 VITALS
HEIGHT: 72 IN | SYSTOLIC BLOOD PRESSURE: 139 MMHG | RESPIRATION RATE: 16 BRPM | DIASTOLIC BLOOD PRESSURE: 93 MMHG | HEART RATE: 72 BPM | WEIGHT: 232.4 LBS | TEMPERATURE: 97.9 F | OXYGEN SATURATION: 95 % | BODY MASS INDEX: 31.48 KG/M2

## 2024-07-16 PROCEDURE — 99239 HOSP IP/OBS DSCHRG MGMT >30: CPT | Performed by: INTERNAL MEDICINE

## 2024-07-16 RX ORDER — LISINOPRIL 20 MG/1
20 TABLET ORAL DAILY
Qty: 30 TABLET | Refills: 0 | Status: SHIPPED | OUTPATIENT
Start: 2024-07-16

## 2024-07-16 RX ADMIN — LISINOPRIL 20 MG: 20 TABLET ORAL at 08:54

## 2024-07-16 NOTE — ASSESSMENT & PLAN NOTE
All started today at 11 with a memory loss and repetitive questions.  Patient had an excessive amount to drink yesterday at the pool which she never did and mixed beer with whiskey.  CT of the brain is negative for now all workup was negative.  Highly suspect this could be a transient global amnesia secondary to excessive alcohol.  But will need to rule out stroke seizure neurology recommended MRI of the brain with and without contrast seizure protocol and EEG.  Discussed with wife and the patient and in agreement.  TSH is normal there is no infection  Neurology input appreciated.  Patient underwent stroke workup including MRI of the brain and EEG which were unremarkable.  Symptoms likely secondary to transient global amnesia and memory deficits have resolved.  Stable for discharge home today.  No further neurowork-up recommended.  Recommended decreased alcohol use.  PCP follow-up within 1 week of discharge.

## 2024-07-16 NOTE — PLAN OF CARE
Problem: PAIN - ADULT  Goal: Verbalizes/displays adequate comfort level or baseline comfort level  Description: Interventions:  - Encourage patient to monitor pain and request assistance  - Assess pain using appropriate pain scale  - Administer analgesics based on type and severity of pain and evaluate response  - Implement non-pharmacological measures as appropriate and evaluate response  - Consider cultural and social influences on pain and pain management  - Notify physician/advanced practitioner if interventions unsuccessful or patient reports new pain  Outcome: Progressing     Problem: INFECTION - ADULT  Goal: Absence or prevention of progression during hospitalization  Description: INTERVENTIONS:  - Assess and monitor for signs and symptoms of infection  - Monitor lab/diagnostic results  - Monitor all insertion sites, i.e. indwelling lines, tubes, and drains  - Monitor endotracheal if appropriate and nasal secretions for changes in amount and color  - [place] appropriate cooling/warming therapies per order  - Administer medications as ordered  - Instruct and encourage patient and family to use good hand hygiene technique  - Identify and instruct in appropriate isolation precautions for identified infection/condition  Outcome: Progressing  Goal: Absence of fever/infection during neutropenic period  Description: INTERVENTIONS:  - Monitor WBC    Outcome: Progressing     Problem: SAFETY ADULT  Goal: Patient will remain free of falls  Description: INTERVENTIONS:  - Educate patient/family on patient safety including physical limitations  - Instruct patient to call for assistance with activity   - Consult OT/PT to assist with strengthening/mobility   - Keep Call bell within reach  - Keep bed low and locked with side rails adjusted as appropriate  - Keep care items and personal belongings within reach  - Initiate and maintain comfort rounds  - Make Fall Risk Sign visible to staff  - Offer Toileting every 2 Hours,  in advance of need  - Initiate/Maintain alarm  - Obtain necessary fall risk management equipment  - Apply yellow socks and bracelet for high fall risk patients  - Consider moving patient to room near nurses station  Outcome: Progressing  Goal: Maintain or return to baseline ADL function  Description: INTERVENTIONS:  -  Assess patient's ability to carry out ADLs; assess patient's baseline for ADL function and identify physical deficits which impact ability to perform ADLs (bathing, care of mouth/teeth, toileting, grooming, dressing, etc.)  - Assess/evaluate cause of self-care deficits   - Assess range of motion  - Assess patient's mobility; develop plan if impaired  - Assess patient's need for assistive devices and provide as appropriate  - Encourage maximum independence but intervene and supervise when necessary  - Involve family in performance of ADLs  - Assess for home care needs following discharge   - Consider OT consult to assist with ADL evaluation and planning for discharge  - Provide patient education as appropriate  Outcome: Progressing  Goal: Maintains/Returns to pre admission functional level  Description: INTERVENTIONS:  - Perform AM-PAC 6 Click Basic Mobility/ Daily Activity assessment daily.  - Set and communicate daily mobility goal to care team and patient/family/caregiver.   - Collaborate with rehabilitation services on mobility goals if consulted  - Reposition patient every 2 hours.  - Stand patient 3 times a day  - Ambulate patient 3 times a day  - Out of bed to chair 3 times a day   - Out of bed for meals 3 times a day  - Out of bed for toileting  - Record patient progress and toleration of activity level   Outcome: Progressing     Problem: DISCHARGE PLANNING  Goal: Discharge to home or other facility with appropriate resources  Description: INTERVENTIONS:  - Identify barriers to discharge w/patient and caregiver  - Arrange for needed discharge resources and transportation as appropriate  -  Identify discharge learning needs (meds, wound care, etc.)  - Arrange for interpretive services to assist at discharge as needed  - Refer to Case Management Department for coordinating discharge planning if the patient needs post-hospital services based on physician/advanced practitioner order or complex needs related to functional status, cognitive ability, or social support system  Outcome: Progressing     Problem: Knowledge Deficit  Goal: Patient/family/caregiver demonstrates understanding of disease process, treatment plan, medications, and discharge instructions  Description: Complete learning assessment and assess knowledge base.  Interventions:  - Provide teaching at level of understanding  - Provide teaching via preferred learning methods  Outcome: Progressing

## 2024-07-16 NOTE — DISCHARGE SUMMARY
[facility]  Discharge- Geronimo Gamboa 1975, 49 y.o. male MRN: [id number]  Unit/Bed#: [facility] Encounter: [id number]  Primary Care Provider: Bryan Cabral DO   Date and time admitted to hospital: 7/14/2024  1:41 PM    * Transient global amnesia  Assessment & Plan  All started today at 11 with a memory loss and repetitive questions.  Patient had an excessive amount to drink yesterday at the pool which she never did and mixed beer with whiskey.  CT of the brain is negative for now all workup was negative.  Highly suspect this could be a transient global amnesia secondary to excessive alcohol.  But will need to rule out stroke seizure neurology recommended MRI of the brain with and without contrast seizure protocol and EEG.  Discussed with wife and the patient and in agreement.  TSH is normal there is no infection  Neurology input appreciated.  Patient underwent stroke workup including MRI of the brain and EEG which were unremarkable.  Symptoms likely secondary to transient global amnesia and memory deficits have resolved.  Stable for discharge home today.  No further neurowork-up recommended.  Recommended decreased alcohol use.  PCP follow-up within 1 week of discharge.    HTN (hypertension)  Assessment & Plan  Blood pressure improved now that he is less stressed continue his lisinopril 20 mg daily        Medical Problems       Resolved Problems  Date Reviewed: 7/15/2024   None       Discharging Physician / Practitioner: Kelley Rick MD  PCP: Bryan Cabral DO  Admission Date:   Admission Orders (From admission, onward)       Ordered        07/14/24 1728  Place in Observation  Once                          Discharge Date: 07/16/24    Consultations During Hospital Stay:  Neurology    Procedures Performed:   MRI brain no acute intracranial abnormality  CTA head and neck negative for any large vessel occlusion, dissection, aneurysm or high-grade stenosis.    Significant Findings /  Test Results:   EEG: Negative    Incidental Findings:   NA       Test Results Pending at Discharge (will require follow up):   NA     Outpatient Tests Requested:  NA    Complications:  None    Reason for Admission: Memory deficit    Hospital Course:   Geronimo Gamboa is a 49 y.o. male patient who originally presented to the hospital on 7/14/2024 due to transient memory changes after heavy alcohol use.  Admitted and stroke was ruled out with negative MRI, CTA head and neck and EEG.  Symptoms had resolved.  Stable for discharge home without any further neurowork-up.            Please see above list of diagnoses and related plan for additional information.     Condition at Discharge: stable    Discharge Day Visit / Exam:   Subjective: Seen and examined at bedside.  Does not offer any complaints.  No acute events overnight.  Mentation is back to baseline.  Denies any memory deficits.  Vitals: Blood Pressure: 139/93 (07/16/24 0728)  Pulse: 72 (07/16/24 0728)  Temperature: 97.9 °F (36.6 °C) (07/16/24 0728)  Temp Source: Temporal (07/14/24 1348)  Respirations: 16 (07/16/24 0728)  Height: 6' (182.9 cm) (07/14/24 1800)  Weight - Scale: 105 kg (232 lb 6.4 oz) (07/14/24 1800)  SpO2: 95 % (07/16/24 0900)  Exam:   Physical Exam  Constitutional:       General: He is not in acute distress.     Appearance: He is obese.   HENT:      Head: Normocephalic.      Nose: Nose normal.      Mouth/Throat:      Mouth: Mucous membranes are moist.   Eyes:      Extraocular Movements: Extraocular movements intact.   Cardiovascular:      Rate and Rhythm: Normal rate and regular rhythm.   Pulmonary:      Effort: Pulmonary effort is normal.   Abdominal:      General: Abdomen is flat.   Musculoskeletal:      Right lower leg: No edema.      Left lower leg: No edema.   Skin:     Coloration: Skin is not jaundiced.   Neurological:      General: No focal deficit present.      Mental Status: He is alert and oriented to person, place, and time. Mental status  is at baseline.      Cranial Nerves: No cranial nerve deficit.      Sensory: No sensory deficit.      Motor: No weakness.      Coordination: Coordination normal.      Gait: Gait normal.      Deep Tendon Reflexes: Reflexes normal.          Discussion with Family: Updated  (wife) at bedside.    Discharge instructions/Information to patient and family:   See after visit summary for information provided to patient and family.      Provisions for Follow-Up Care:  See after visit summary for information related to follow-up care and any pertinent home health orders.      Mobility at time of Discharge:   Basic Mobility Inpatient Raw Score: 24  JH-HLM Goal: 8: Walk 250 feet or more  JH-HLM Achieved: 8: Walk 250 feet ot more  HLM Goal achieved. Continue to encourage appropriate mobility.     Disposition:   Home    Planned Readmission: no     Discharge Statement:  I spent 30 minutes discharging the patient. This time was spent on the day of discharge. I had direct contact with the patient on the day of discharge. Greater than 50% of the total time was spent examining patient, answering all patient questions, arranging and discussing plan of care with patient as well as directly providing post-discharge instructions.  Additional time then spent on discharge activities.    Discharge Medications:  See after visit summary for reconciled discharge medications provided to patient and/or family.      **Please Note: This note may have been constructed using a voice recognition system**

## 2025-03-31 ENCOUNTER — TELEPHONE (OUTPATIENT)
Age: 50
End: 2025-03-31

## 2025-03-31 NOTE — TELEPHONE ENCOUNTER
New Patient    Appointment Scheduling  What office location does the patient prefer?: [facility]  What is the reason for the patient's appointment?: Enlarged prostate, placed on medication by his PCP to calm down urinary frequency. PT is still feeling urge to go to the bathroom a lot and was referred by his PCP to see Dr. Albert.     Have patient records been requested?: N  If No, are the records showing in Epic:  Y     Appointment Details  Date: 5/14/25  Time:    2:30 PM  Location:   [facility]  Provider:  Dr. Albert  Does the appointment need further review? (Reason) Y  NP REF: Benign prostatic hyperplasia with lower urinary tract symptoms, Other obstructive and reflux uropathy and Frequency of micturition         HISTORY  Is the patient having active symptoms? If so, describe symptoms: Y  Has the patient had any previous Urologist(s)?: N  Was the patient seen in the ED?: N  Has the patient had any outside testing done?: N  Does patient have Imaging/Lab Results: [facility], PSA, UA on friday  Have you had Cancer? N    INSURANCE   Have you confirmed Patient's insurance? Y  Is the insurance accepted?  Y  Is the insurance active? Y

## 2025-05-12 RX ORDER — FINASTERIDE 5 MG/1
5 TABLET, FILM COATED ORAL EVERY MORNING
COMMUNITY
Start: 2025-03-25

## 2025-05-13 PROBLEM — N40.1 BENIGN PROSTATIC HYPERPLASIA WITH URINARY FREQUENCY: Status: ACTIVE | Noted: 2025-05-13

## 2025-05-13 PROBLEM — R35.0 BENIGN PROSTATIC HYPERPLASIA WITH URINARY FREQUENCY: Status: ACTIVE | Noted: 2025-05-13

## 2025-05-14 ENCOUNTER — OFFICE VISIT (OUTPATIENT)
Dept: UROLOGY | Facility: CLINIC | Age: 50
End: 2025-05-14
Payer: COMMERCIAL

## 2025-05-14 VITALS
BODY MASS INDEX: 32.34 KG/M2 | SYSTOLIC BLOOD PRESSURE: 122 MMHG | HEART RATE: 67 BPM | OXYGEN SATURATION: 98 % | DIASTOLIC BLOOD PRESSURE: 74 MMHG | TEMPERATURE: 98 F | HEIGHT: 72 IN | WEIGHT: 238.8 LBS

## 2025-05-14 DIAGNOSIS — N40.1 BENIGN PROSTATIC HYPERPLASIA WITH URINARY FREQUENCY: Primary | ICD-10-CM

## 2025-05-14 DIAGNOSIS — R35.0 BENIGN PROSTATIC HYPERPLASIA WITH URINARY FREQUENCY: Primary | ICD-10-CM

## 2025-05-14 LAB
POST-VOID RESIDUAL VOLUME, ML POC: 10 ML
SL AMB  POCT GLUCOSE, UA: NORMAL
SL AMB LEUKOCYTE ESTERASE,UA: NORMAL
SL AMB POCT BILIRUBIN,UA: NORMAL
SL AMB POCT BLOOD,UA: NORMAL
SL AMB POCT CLARITY,UA: CLEAR
SL AMB POCT COLOR,UA: YELLOW
SL AMB POCT KETONES,UA: NORMAL
SL AMB POCT NITRITE,UA: NORMAL
SL AMB POCT PH,UA: 6
SL AMB POCT SPECIFIC GRAVITY,UA: 1.01
SL AMB POCT URINE PROTEIN: NORMAL
SL AMB POCT UROBILINOGEN: 0.2

## 2025-05-14 PROCEDURE — 51798 US URINE CAPACITY MEASURE: CPT | Performed by: UROLOGY

## 2025-05-14 PROCEDURE — 81003 URINALYSIS AUTO W/O SCOPE: CPT | Performed by: UROLOGY

## 2025-05-14 PROCEDURE — 99204 OFFICE O/P NEW MOD 45 MIN: CPT | Performed by: UROLOGY

## 2025-05-14 RX ORDER — TADALAFIL 5 MG/1
5 TABLET ORAL DAILY PRN
Qty: 90 TABLET | Refills: 3 | Status: SHIPPED | OUTPATIENT
Start: 2025-05-14

## 2025-05-14 NOTE — PROGRESS NOTES
UROLOGY PROGRESS NOTE         NAME: Geronimo Gamboa  AGE: 50 y.o. SEX: male  : 1975   MRN: [id number]    DATE: 2025  TIME: 3:08 PM    Assessment and Plan      Impression:   1. Benign prostatic hyperplasia with urinary frequency  -     POCT urine dip auto non-scope  -     POCT Measure PVR  -     US kidney and bladder with pvr; Future; Expected date: 2025       Plan: Patient is going to continue the finasteride for now.  I am trialing him on Gemtesa and we are going to check and make sure his PVR is normal today before doing that.  In addition I am having him get a renal and bladder sonogram and I am going to see him back in about 1 month.  UA today is normal.  I suspect a component of overactive bladder with BPH.  Depending on his response and the renal ultrasound results, we may recommend flexible cystoscopy.      Chief Complaint     Chief Complaint   Patient presents with   • Benign Prostatic Hypertrophy     PSA 2025 0.17     History of Present Illness     HPI: Geronimo Gamboa is a 50 y.o. year old male who presents with BPH.  Urinary frequency.  Patient on finasteride.  PSA 0.17.  Patient tried Flomax but caused some lightheadedness.  Therefore discontinued.  Primarily symptoms are urge related/irritative.  UA normal              The following portions of the patient's history were reviewed and updated as appropriate: allergies, current medications, past family history, past medical history, past social history, past surgical history and problem list.  Past Medical History:   Diagnosis Date   • Hypertension      Past Surgical History:   Procedure Laterality Date   • NO PAST SURGERIES       shoulder  Review of Systems     Const: Denies chills, fever and weight loss.  CV: Denies chest pain.  Resp: Denies SOB.  GI: Denies abdominal pain, nausea and vomiting.  : Denies symptoms other than stated above.  Musculo: Denies back pain.    Objective   /74   Pulse 67   Temp 98 °F (36.7 °C)    Ht 6' (1.829 m)   Wt 108 kg (238 lb 12.8 oz)   SpO2 98%   BMI 32.39 kg/m²     Physical Exam  Const: Appears healthy and well developed. No signs of acute distress present.  Resp: Respirations are regular and unlabored.   CV: Rate is regular. Rhythm is regular.  Abdomen: Abdomen is soft, nontender, and nondistended. Kidneys are not palpable.  : External genitalia are normal.  No hernia.  Circumcised.  Prostate is 2-3+ in size no gross nodule or hard area.  Patient's been on finasteride for about 6 months.  Psych: Patient's attitude is cooperative. Mood is normal. Affect is normal.    Procedure   Procedures     Current Medications     Current Outpatient Medications:   •  finasteride (PROSCAR) 5 mg tablet, Take 5 mg by mouth every morning, Disp: , Rfl:   •  lisinopril (ZESTRIL) 20 mg tablet, Take 1 tablet (20 mg total) by mouth daily, Disp: 30 tablet, Rfl: 0        Suad Albert MD

## 2025-05-14 NOTE — PATIENT INSTRUCTIONS
Please discontinue your finasteride.    Please get your renal ultrasound.    Please trial Gemtesa first.    Add tadalafil after on Gemtesa for a week or 2.    Please monitor your response to each.

## 2025-05-20 ENCOUNTER — HOSPITAL ENCOUNTER (OUTPATIENT)
Dept: ULTRASOUND IMAGING | Facility: HOSPITAL | Age: 50
Discharge: HOME | End: 2025-05-20
Attending: UROLOGY
Payer: COMMERCIAL

## 2025-05-20 DIAGNOSIS — R35.0 BENIGN PROSTATIC HYPERPLASIA WITH URINARY FREQUENCY: ICD-10-CM

## 2025-05-20 DIAGNOSIS — N40.1 BENIGN PROSTATIC HYPERPLASIA WITH URINARY FREQUENCY: ICD-10-CM

## 2025-05-20 PROCEDURE — 76770 US EXAM ABDO BACK WALL COMP: CPT

## 2025-06-17 ENCOUNTER — OFFICE VISIT (OUTPATIENT)
Dept: UROLOGY | Facility: CLINIC | Age: 50
End: 2025-06-17
Payer: COMMERCIAL

## 2025-06-17 VITALS
TEMPERATURE: 98.7 F | HEART RATE: 84 BPM | SYSTOLIC BLOOD PRESSURE: 118 MMHG | WEIGHT: 239.6 LBS | BODY MASS INDEX: 32.45 KG/M2 | DIASTOLIC BLOOD PRESSURE: 74 MMHG | OXYGEN SATURATION: 97 % | HEIGHT: 72 IN

## 2025-06-17 DIAGNOSIS — R35.0 BENIGN PROSTATIC HYPERPLASIA WITH URINARY FREQUENCY: Primary | ICD-10-CM

## 2025-06-17 DIAGNOSIS — N40.1 BENIGN PROSTATIC HYPERPLASIA WITH URINARY FREQUENCY: Primary | ICD-10-CM

## 2025-06-17 LAB
SL AMB  POCT GLUCOSE, UA: NORMAL
SL AMB LEUKOCYTE ESTERASE,UA: NORMAL
SL AMB POCT BILIRUBIN,UA: NORMAL
SL AMB POCT BLOOD,UA: NORMAL
SL AMB POCT CLARITY,UA: CLEAR
SL AMB POCT COLOR,UA: YELLOW
SL AMB POCT KETONES,UA: NORMAL
SL AMB POCT NITRITE,UA: NORMAL
SL AMB POCT PH,UA: 6
SL AMB POCT SPECIFIC GRAVITY,UA: 1.02
SL AMB POCT URINE PROTEIN: NORMAL
SL AMB POCT UROBILINOGEN: 1

## 2025-06-17 PROCEDURE — 81003 URINALYSIS AUTO W/O SCOPE: CPT | Performed by: UROLOGY

## 2025-06-17 PROCEDURE — 99214 OFFICE O/P EST MOD 30 MIN: CPT | Performed by: UROLOGY

## 2025-06-17 RX ORDER — ALFUZOSIN HYDROCHLORIDE 10 MG/1
10 TABLET, EXTENDED RELEASE ORAL DAILY
Qty: 30 TABLET | Refills: 11 | Status: SHIPPED | OUTPATIENT
Start: 2025-06-17

## 2025-06-17 NOTE — PROGRESS NOTES
UROLOGY PROGRESS NOTE         NAME: Geronimo Gamboa  AGE: 50 y.o. SEX: male  : 1975   MRN: [id number]    DATE: 2025  TIME: 12:05 PM    Assessment and Plan      Impression:   1. Benign prostatic hyperplasia with urinary frequency  -     POCT urine dip auto non-scope  -     alfuzosin (UROXATRAL) 10 mg 24 hr tablet; Take 1 tablet (10 mg total) by mouth daily       Plan: We are going to discontinue finasteride.  This did not improve patient's symptoms.  Some of the issues are related to how much and what the patient drinks in the evening.  He notes that it is worse with alcohol.  I am going to add alfuzosin 10 mg daily after supper.  Gemtesa did not help whatsoever therefore unlikely to be OAB.  Renal and bladder sonogram normal.  Urinalysis today normal.  Postvoid residual last visit normal.  Patient is going to make some lifestyle changes and trialed the alfuzosin.  If things are good we will see him back in 6 months.  Also told him to discontinue the finasteride.  Cystoscopy if persistent bothersome symptoms.      Chief Complaint     Chief Complaint   Patient presents with   • Follow-up     Pt reports no change.    • Urinary Frequency   • Urinary Urgency   • Nocturia     History of Present Illness     HPI: Geronimo Gamboa is a 50 y.o. year old male who presents with history of BPH.  Patient on tadalafil and finasteride.  Had renal ultrasound done with bladder imaging as well unremarkable.  Patient here for follow-up.  Patient given trial of Gemtesa last visit.  PSA 0.17              The following portions of the patient's history were reviewed and updated as appropriate: allergies, current medications, past family history, past medical history, past social history, past surgical history and problem list.  Past Medical History[1]  Past Surgical History[2]  shoulder  Review of Systems     Const: Denies chills, fever and weight loss.  CV: Denies chest pain.  Resp: Denies SOB.  GI: Denies abdominal pain,  nausea and vomiting.  : Denies symptoms other than stated above.  Musculo: Denies back pain.    Objective   /74   Pulse 84   Temp 98.7 °F (37.1 °C)   Ht 6' (1.829 m)   Wt 109 kg (239 lb 9.6 oz)   SpO2 97%   BMI 32.50 kg/m²     Physical Exam  Const: Appears healthy and well developed. No signs of acute distress present.  Resp: Respirations are regular and unlabored.   CV: Rate is regular. Rhythm is regular.  Abdomen: Abdomen is soft, nontender, and nondistended. Kidneys are not palpable.  : Not performed  Psych: Patient's attitude is cooperative. Mood is normal. Affect is normal.    Procedure   Procedures     Current Medications   Current Medications[3]        Suad Albert MD               [1]  Past Medical History:  Diagnosis Date   • Hypertension    [2]  Past Surgical History:  Procedure Laterality Date   • NO PAST SURGERIES     [3]    Current Outpatient Medications:   •  alfuzosin (UROXATRAL) 10 mg 24 hr tablet, Take 1 tablet (10 mg total) by mouth daily, Disp: 30 tablet, Rfl: 11  •  lisinopril (ZESTRIL) 20 mg tablet, Take 1 tablet (20 mg total) by mouth daily, Disp: 30 tablet, Rfl: 0  •  tadalafil (CIALIS) 5 MG tablet, Take 1 tablet (5 mg total) by mouth daily as needed (bph), Disp: 90 tablet, Rfl: 3  •  finasteride (PROSCAR) 5 mg tablet, Take 5 mg by mouth every morning (Patient not taking: Reported on 6/17/2025), Disp: , Rfl: